# Patient Record
Sex: FEMALE | Race: WHITE | NOT HISPANIC OR LATINO | Employment: PART TIME | URBAN - METROPOLITAN AREA
[De-identification: names, ages, dates, MRNs, and addresses within clinical notes are randomized per-mention and may not be internally consistent; named-entity substitution may affect disease eponyms.]

---

## 2021-08-23 ENCOUNTER — OFFICE VISIT (OUTPATIENT)
Dept: URGENT CARE | Facility: CLINIC | Age: 61
End: 2021-08-23
Payer: COMMERCIAL

## 2021-08-23 VITALS
HEIGHT: 67 IN | OXYGEN SATURATION: 93 % | TEMPERATURE: 98.4 F | BODY MASS INDEX: 25.11 KG/M2 | WEIGHT: 160 LBS | RESPIRATION RATE: 20 BRPM | HEART RATE: 112 BPM

## 2021-08-23 DIAGNOSIS — H69.93 DISORDER OF BOTH EUSTACHIAN TUBES: ICD-10-CM

## 2021-08-23 DIAGNOSIS — R68.89 FLU-LIKE SYMPTOMS: Primary | ICD-10-CM

## 2021-08-23 PROCEDURE — G0382 LEV 3 HOSP TYPE B ED VISIT: HCPCS | Performed by: EMERGENCY MEDICINE

## 2021-08-23 PROCEDURE — U0005 INFEC AGEN DETEC AMPLI PROBE: HCPCS | Performed by: EMERGENCY MEDICINE

## 2021-08-23 PROCEDURE — U0003 INFECTIOUS AGENT DETECTION BY NUCLEIC ACID (DNA OR RNA); SEVERE ACUTE RESPIRATORY SYNDROME CORONAVIRUS 2 (SARS-COV-2) (CORONAVIRUS DISEASE [COVID-19]), AMPLIFIED PROBE TECHNIQUE, MAKING USE OF HIGH THROUGHPUT TECHNOLOGIES AS DESCRIBED BY CMS-2020-01-R: HCPCS | Performed by: EMERGENCY MEDICINE

## 2021-08-23 RX ORDER — PANTOPRAZOLE SODIUM 40 MG/1
40 TABLET, DELAYED RELEASE ORAL 2 TIMES DAILY
COMMUNITY

## 2021-08-23 RX ORDER — ALBUTEROL SULFATE 2.5 MG/3ML
2.5 SOLUTION RESPIRATORY (INHALATION) EVERY 6 HOURS PRN
Qty: 180 ML | Refills: 0 | Status: SHIPPED | OUTPATIENT
Start: 2021-08-23

## 2021-08-23 RX ORDER — SPIRONOLACTONE 25 MG/1
25 TABLET ORAL DAILY
COMMUNITY

## 2021-08-23 RX ORDER — RIBOFLAVIN (VITAMIN B2) 100 MG
100 TABLET ORAL DAILY
COMMUNITY

## 2021-08-23 RX ORDER — ZINC GLUCONATE 50 MG
50 TABLET ORAL DAILY
COMMUNITY

## 2021-08-23 RX ORDER — MAGNESIUM 30 MG
30 TABLET ORAL 2 TIMES DAILY
COMMUNITY

## 2021-08-23 RX ORDER — METOPROLOL TARTRATE 50 MG/1
50 TABLET, FILM COATED ORAL EVERY 12 HOURS SCHEDULED
COMMUNITY

## 2021-08-23 RX ORDER — MELATONIN
1000 DAILY
COMMUNITY

## 2021-08-23 RX ORDER — AMLODIPINE BESYLATE 10 MG/1
10 TABLET ORAL DAILY
COMMUNITY

## 2021-08-23 NOTE — PATIENT INSTRUCTIONS
You have been diagnosed with a flu-like illness, and your symptoms should resolve over the next 7 to 10 days with the treatments recommended today  If they do not, it is possible that you have developed a bacterial infection and you should return  If you were to take an antibiotic while you are still in the viral stage, you will not get better any faster, but could kill off  good germs in your body as well as make germs  resistant to the antibiotic  Take an expectorant - guaifenesin should be the only ingredient - during the day, and the cough suppressant (ex  Robitussin DM or Tessalon) if needed at night only  Take Zinc 12 5 to 15 mg every 2 - 3 hrs while awake for the next few days  You may take Cold Corky (13 3 mg of Zinc) or split a 25 mg Zinc tablet or lozenge in two or a 50 mg into four to get the proper dose  The total daily dose of Zinc should exceed 75 mg per day  You should also take Quercetin 500 mg twice daily  You may also take vitamin D 3 2000 i u s per day for the next 1 week  You may also take a decongestant like Sudafed, unless you have hypertension or cardiac disease  You may take Imodium for diarrhea according to package instructions  Flu-like illness   AMBULATORY CARE:   Flu-like illness is an infection caused by a virus  The flu is easily spread when an infected person coughs, sneezes, or has close contact with others  You may be able to spread the flu to others for 1 week or longer after signs or symptoms appear  Common signs and symptoms include the following:   · Fever and chills    · Headaches, body aches, and muscle or joint pain    · Cough, runny nose, and sore throat    · Loss of appetite, nausea, vomiting, or diarrhea    · Tiredness    · Trouble breathing  Call 911 for any of the following:   · You have trouble breathing, and your lips look purple or blue  · You have a seizure  Seek care immediately if:   · You are dizzy, or you are urinating less or not at all  · You have a headache with a stiff neck, and you feel tired or confused  · You have new pain or pressure in your chest     · Your symptoms, such as shortness of breath, vomiting, or diarrhea, get worse  · Your symptoms, such as fever and coughing, seem to get better, but then get worse  Contact your healthcare provider if:   · You have new muscle pain or weakness  · You have questions or concerns about your condition or care  Treatment for influenza  may include any of the following:  · Acetaminophen  decreases pain and fever  It is available without a doctor's order  Ask how much to take and how often to take it  Follow directions  Acetaminophen can cause liver damage if not taken correctly  · NSAIDs , such as ibuprofen, help decrease swelling, pain, and fever  This medicine is available with or without a doctor's order  NSAIDs can cause stomach bleeding or kidney problems in certain people  If you take blood thinner medicine, always ask your healthcare provider if NSAIDs are safe for you  Always read the medicine label and follow directions  · Antivirals  help fight a viral infection  Manage your symptoms:   · Rest  as much as you can to help you recover  · Drink liquids as directed  to help prevent dehydration  Ask how much liquid to drink each day and which liquids are best for you  Prevent the spread of the flu:   · Wash your hands often  Use soap and water  Wash your hands after you use the bathroom, change a child's diapers, or sneeze  Wash your hands before you prepare or eat food  Use gel hand cleanser when soap and water are not available  Do not touch your eyes, nose, or mouth unless you have washed your hands first        · Cover your mouth when you sneeze or cough  Cough into a tissue or the bend of your arm  · Clean shared items with a germ-killing   Clean table surfaces, doorknobs, and light switches   Do not share towels, silverware, and dishes with people who are sick  Wash bed sheets, towels, silverware, and dishes with soap and water  · Wear a mask  over your mouth and nose if you are sick or are near anyone who is sick  · Stay away from others  if you are sick  · Influenza vaccine  helps prevent influenza (flu)  Everyone older than 6 months should get a yearly influenza vaccine  Get the vaccine as soon as it is available, usually in September or October each year  Follow up with your healthcare provider as directed:  Write down your questions so you remember to ask them during your visits  © 2017 [employer]  Information is for End User's use only and may not be sold, redistributed or otherwise used for commercial purposes  All illustrations and images included in CareNotes® are the copyrighted property of [employer]  or Cristof[employer]  The above information is an  only  It is not intended as medical advice for individual conditions or treatments  Talk to your doctor, nurse or pharmacist before following any medical regimen to see if it is safe and effective for you  [address]     Your healthcare provider and/or public health staff have evaluated you and have determined that you do not need to be hospitalized at this time  At this time you can be isolated at home where you will be monitored by staff from your local or state health department  You should carefully follow the prevention and isolation steps below until a healthcare provider or local or state health department says that you can return to your normal activities  Stay home except to get medical care     People who are mildly ill with COVID-19 are able to isolate at home during their illness  You should restrict activities outside your home, except for getting medical care  Do not go to work, school, or public areas  Avoid using public transportation, ride-sharing, or taxis       Separate yourself from other people and animals in your home     People: As much as possible, you should stay in a specific room and away from other people in your home  Also, you should use a separate bathroom, if available  Animals: You should restrict contact with pets and other animals while you are sick with COVID-19, just like you would around other people  Although there have not been reports of pets or other animals becoming sick with COVID-19, it is still recommended that people sick with COVID-19 limit contact with animals until more information is known about the virus  When possible, have another member of your household care for your animals while you are sick  If you are sick with COVID-19, avoid contact with your pet, including petting, snuggling, being kissed or licked, and sharing food  If you must care for your pet or be around animals while you are sick, wash your hands before and after you interact with pets and wear a facemask  See COVID-19 and Animals for more information  Call ahead before visiting your doctor     If you have a medical appointment, call the healthcare provider and tell them that you have or may have COVID-19  This will help the healthcare providers office take steps to keep other people from getting infected or exposed  Wear a facemask     You should wear a facemask when you are around other people (e g , sharing a room or vehicle) or pets and before you enter a healthcare providers office  If you are not able to wear a facemask (for example, because it causes trouble breathing), then people who live with you should not stay in the same room with you, or they should wear a facemask if they enter your room  Cover your coughs and sneezes     Cover your mouth and nose with a tissue when you cough or sneeze  Throw used tissues in a lined trash can   Immediately wash your hands with soap and water for at least 20 seconds or, if soap and water are not available, clean your hands with an alcohol-based hand  that contains at least 60% alcohol  Clean your hands often     Wash your hands often with soap and water for at least 20 seconds, especially after blowing your nose, coughing, or sneezing; going to the bathroom; and before eating or preparing food  If soap and water are not readily available, use an alcohol-based hand  with at least 60% alcohol, covering all surfaces of your hands and rubbing them together until they feel dry  Soap and water are the best option if hands are visibly dirty  Avoid touching your eyes, nose, and mouth with unwashed hands  Avoid sharing personal household items     You should not share dishes, drinking glasses, cups, eating utensils, towels, or bedding with other people or pets in your home  After using these items, they should be washed thoroughly with soap and water  Clean all high-touch surfaces everyday     High touch surfaces include counters, tabletops, doorknobs, bathroom fixtures, toilets, phones, keyboards, tablets, and bedside tables  Also, clean any surfaces that may have blood, stool, or body fluids on them  Use a household cleaning spray or wipe, according to the label instructions  Labels contain instructions for safe and effective use of the cleaning product including precautions you should take when applying the product, such as wearing gloves and making sure you have good ventilation during use of the product  Monitor your symptoms     Seek prompt medical attention if your illness is worsening (e g , difficulty breathing)  Before seeking care, call your healthcare provider and tell them that you have, or are being evaluated for, COVID-19  Put on a facemask before you enter the facility  These steps will help the healthcare providers office to keep other people in the office or waiting room from getting infected or exposed  Ask your healthcare provider to call the local or [facility] health department   Persons who are placed under active monitoring or facilitated self-monitoring should follow instructions provided by their local health department or occupational health professionals, as appropriate  If you have a medical emergency and need to call 911, notify the dispatch personnel that you have, or are being evaluated for COVID-19  If possible, put on a facemask before emergency medical services arrive  Discontinuing home isolation     Patients with confirmed COVID-19 should remain under home isolation precautions until the risk of secondary transmission to others is thought to be low  The decision to discontinue home isolation precautions should be made on a case-by-case basis, in consultation with healthcare providers and state and local health departments       Source: [employer]        Proceed to ER if symptoms worsen

## 2021-08-23 NOTE — LETTER
August 28, 2021     Patient: Edgard Martin   YOB: 1960   Date of Visit: 8/23/2021       To Whom it May Concern:    Edgard Martin was seen in my clinic on 8/23/2021  She may return to work 9/2/2021 as long as symptoms have improved  If you have any questions or concerns, please don't hesitate to call           Sincerely,          Nicole Becerril MD

## 2021-08-23 NOTE — PROGRESS NOTES
[facility]        NAME: Soraida Self is a 62 y o  female  : 1960    MRN: [id number]  DATE: 2021  TIME: 12:58 PM    Assessment and Plan   Flu-like symptoms [R68 89]  1  Flu-like symptoms  Novel Coronavirus (Covid-19),PCR [facility] - Office Collection    albuterol (2 5 mg/3 mL) 0 083 % nebulizer solution   2  Disorder of both eustachian tubes     Explained to patient and that her oxygen level was slightly low and her heart rate was slightly high  I advised her to go to ER if her condition worsens or new symptoms develop  Consider prednisone taper if symptoms persist and COVID test negative    Patient Instructions     Patient Instructions     You have been diagnosed with a flu-like illness, and your symptoms should resolve over the next 7 to 10 days with the treatments recommended today  If they do not, it is possible that you have developed a bacterial infection and you should return  If you were to take an antibiotic while you are still in the viral stage, you will not get better any faster, but could kill off  good germs in your body as well as make germs  resistant to the antibiotic  Take an expectorant - guaifenesin should be the only ingredient - during the day, and the cough suppressant (ex  Robitussin DM or Tessalon) if needed at night only  Take Zinc 12 5 to 15 mg every 2 - 3 hrs while awake for the next few days  You may take Cold Corky (13 3 mg of Zinc) or split a 25 mg Zinc tablet or lozenge in two or a 50 mg into four to get the proper dose  The total daily dose of Zinc should exceed 75 mg per day  You should also take Quercetin 500 mg twice daily  You may also take vitamin D 3 2000 i u s per day for the next 1 week  You may also take a decongestant like Sudafed, unless you have hypertension or cardiac disease  You may take Imodium for diarrhea according to package instructions        Flu-like illness   AMBULATORY CARE:   Flu-like illness is an infection caused by a virus  The flu is easily spread when an infected person coughs, sneezes, or has close contact with others  You may be able to spread the flu to others for 1 week or longer after signs or symptoms appear  Common signs and symptoms include the following:   · Fever and chills    · Headaches, body aches, and muscle or joint pain    · Cough, runny nose, and sore throat    · Loss of appetite, nausea, vomiting, or diarrhea    · Tiredness    · Trouble breathing  Call 911 for any of the following:   · You have trouble breathing, and your lips look purple or blue  · You have a seizure  Seek care immediately if:   · You are dizzy, or you are urinating less or not at all  · You have a headache with a stiff neck, and you feel tired or confused  · You have new pain or pressure in your chest     · Your symptoms, such as shortness of breath, vomiting, or diarrhea, get worse  · Your symptoms, such as fever and coughing, seem to get better, but then get worse  Contact your healthcare provider if:   · You have new muscle pain or weakness  · You have questions or concerns about your condition or care  Treatment for influenza  may include any of the following:  · Acetaminophen  decreases pain and fever  It is available without a doctor's order  Ask how much to take and how often to take it  Follow directions  Acetaminophen can cause liver damage if not taken correctly  · NSAIDs , such as ibuprofen, help decrease swelling, pain, and fever  This medicine is available with or without a doctor's order  NSAIDs can cause stomach bleeding or kidney problems in certain people  If you take blood thinner medicine, always ask your healthcare provider if NSAIDs are safe for you  Always read the medicine label and follow directions  · Antivirals  help fight a viral infection  Manage your symptoms:   · Rest  as much as you can to help you recover  · Drink liquids as directed  to help prevent dehydration   Ask how much liquid to drink each day and which liquids are best for you  Prevent the spread of the flu:   · Wash your hands often  Use soap and water  Wash your hands after you use the bathroom, change a child's diapers, or sneeze  Wash your hands before you prepare or eat food  Use gel hand cleanser when soap and water are not available  Do not touch your eyes, nose, or mouth unless you have washed your hands first        · Cover your mouth when you sneeze or cough  Cough into a tissue or the bend of your arm  · Clean shared items with a germ-killing   Clean table surfaces, doorknobs, and light switches  Do not share towels, silverware, and dishes with people who are sick  Wash bed sheets, towels, silverware, and dishes with soap and water  · Wear a mask  over your mouth and nose if you are sick or are near anyone who is sick  · Stay away from others  if you are sick  · Influenza vaccine  helps prevent influenza (flu)  Everyone older than 6 months should get a yearly influenza vaccine  Get the vaccine as soon as it is available, usually in September or October each year  Follow up with your healthcare provider as directed:  Write down your questions so you remember to ask them during your visits  © 2017 [address] Information is for End User's use only and may not be sold, redistributed or otherwise used for commercial purposes  All illustrations and images included in CareNotes® are the copyrighted property of [employer]  or Cristofer Martinez  The above information is an  only  It is not intended as medical advice for individual conditions or treatments  Talk to your doctor, nurse or pharmacist before following any medical regimen to see if it is safe and effective for you       [address]     Your healthcare provider and/or public health staff have evaluated you and have determined that you do not need to be hospitalized at this time  At this time you can be isolated at home where you will be monitored by staff from your local or state health department  You should carefully follow the prevention and isolation steps below until a healthcare provider or local or state health department says that you can return to your normal activities  Stay home except to get medical care     People who are mildly ill with COVID-19 are able to isolate at home during their illness  You should restrict activities outside your home, except for getting medical care  Do not go to work, school, or public areas  Avoid using public transportation, ride-sharing, or taxis  Separate yourself from other people and animals in your home     People: As much as possible, you should stay in a specific room and away from other people in your home  Also, you should use a separate bathroom, if available  Animals: You should restrict contact with pets and other animals while you are sick with COVID-19, just like you would around other people  Although there have not been reports of pets or other animals becoming sick with COVID-19, it is still recommended that people sick with COVID-19 limit contact with animals until more information is known about the virus  When possible, have another member of your household care for your animals while you are sick  If you are sick with COVID-19, avoid contact with your pet, including petting, snuggling, being kissed or licked, and sharing food  If you must care for your pet or be around animals while you are sick, wash your hands before and after you interact with pets and wear a facemask  See COVID-19 and Animals for more information  Call ahead before visiting your doctor     If you have a medical appointment, call the healthcare provider and tell them that you have or may have COVID-19  This will help the healthcare providers office take steps to keep other people from getting infected or exposed       Wear a facemask     You should wear a facemask when you are around other people (e g , sharing a room or vehicle) or pets and before you enter a healthcare providers office  If you are not able to wear a facemask (for example, because it causes trouble breathing), then people who live with you should not stay in the same room with you, or they should wear a facemask if they enter your room  Cover your coughs and sneezes     Cover your mouth and nose with a tissue when you cough or sneeze  Throw used tissues in a lined trash can  Immediately wash your hands with soap and water for at least 20 seconds or, if soap and water are not available, clean your hands with an alcohol-based hand  that contains at least 60% alcohol  Clean your hands often     Wash your hands often with soap and water for at least 20 seconds, especially after blowing your nose, coughing, or sneezing; going to the bathroom; and before eating or preparing food  If soap and water are not readily available, use an alcohol-based hand  with at least 60% alcohol, covering all surfaces of your hands and rubbing them together until they feel dry  Soap and water are the best option if hands are visibly dirty  Avoid touching your eyes, nose, and mouth with unwashed hands  Avoid sharing personal household items     You should not share dishes, drinking glasses, cups, eating utensils, towels, or bedding with other people or pets in your home  After using these items, they should be washed thoroughly with soap and water  Clean all high-touch surfaces everyday     High touch surfaces include counters, tabletops, doorknobs, bathroom fixtures, toilets, phones, keyboards, tablets, and bedside tables  Also, clean any surfaces that may have blood, stool, or body fluids on them  Use a household cleaning spray or wipe, according to the label instructions   Labels contain instructions for safe and effective use of the cleaning product including precautions you should take when applying the product, such as wearing gloves and making sure you have good ventilation during use of the product  Monitor your symptoms     Seek prompt medical attention if your illness is worsening (e g , difficulty breathing)  Before seeking care, call your healthcare provider and tell them that you have, or are being evaluated for, COVID-19  Put on a facemask before you enter the facility  These steps will help the healthcare providers office to keep other people in the office or waiting room from getting infected or exposed  Ask your healthcare provider to call the local or [facility] health department  Persons who are placed under active monitoring or facilitated self-monitoring should follow instructions provided by their local health department or occupational health professionals, as appropriate  If you have a medical emergency and need to call 911, notify the dispatch personnel that you have, or are being evaluated for COVID-19  If possible, put on a facemask before emergency medical services arrive  Discontinuing home isolation     Patients with confirmed COVID-19 should remain under home isolation precautions until the risk of secondary transmission to others is thought to be low  The decision to discontinue home isolation precautions should be made on a case-by-case basis, in consultation with healthcare providers and [facility] and [facility] health departments  Source: [employer]        Proceed to ER if symptoms worsen      Follow up with PCP in 3-5 days  Proceed to  ER if symptoms worsen      Chief Complaint     Chief Complaint   Patient presents with    COVID-19     coughing, runny nose, bilateral ear pain, fatigue , no appetite and chest tightness x 2 weeks         History of Present Illness       Patient complains of cough, congestion, fatigue, decreased appetite, chest tightness, bilateral ear pain for the past 13 days  She is fully vaccinated against COVID  Symptoms developed 1 day after she returned home from a motorcycle rally  Cough is productive of clear sputum, nasal discharge is clear  She has a nebulizer at home but is out of albuterol nebulizer solution  She does have an albuterol inhaler but she has only used it a few times through the past 2 weeks  She quit smoking 30 years ago  Review of Systems   Review of Systems   Constitutional: Negative for chills and fever  HENT: Positive for congestion, ear pain and rhinorrhea  Negative for sinus pressure, sore throat, trouble swallowing and voice change  Respiratory: Positive for cough and chest tightness  Negative for shortness of breath and wheezing  Cardiovascular: Negative for chest pain           Current Medications       Current Outpatient Medications:     amLODIPine (NORVASC) 10 mg tablet, Take 10 mg by mouth daily, Disp: , Rfl:     Ascorbic Acid (vitamin C) 100 MG tablet, Take 100 mg by mouth daily, Disp: , Rfl:     cholecalciferol (VITAMIN D3) 1,000 units tablet, Take 1,000 Units by mouth daily, Disp: , Rfl:     magnesium 30 MG tablet, Take 30 mg by mouth 2 (two) times a day, Disp: , Rfl:     metoprolol tartrate (LOPRESSOR) 50 mg tablet, Take 50 mg by mouth every 12 (twelve) hours, Disp: , Rfl:     pantoprazole (PROTONIX) 40 mg tablet, Take 40 mg by mouth 2 (two) times a day, Disp: , Rfl:     spironolactone (ALDACTONE) 25 mg tablet, Take 25 mg by mouth daily, Disp: , Rfl:     zinc gluconate 50 mg tablet, Take 50 mg by mouth daily, Disp: , Rfl:     albuterol (2 5 mg/3 mL) 0 083 % nebulizer solution, Take 3 mL (2 5 mg total) by nebulization every 6 (six) hours as needed for shortness of breath Dispense 60 vials, Disp: 180 mL, Rfl: 0    Current Allergies     Allergies as of 08/23/2021 - never reviewed   Allergen Reaction Noted    Ace inhibitors Anaphylaxis 08/23/2021    Ciprofloxacin Rash 08/23/2021    Sulfa antibiotics Rash 08/23/2021            The following portions of the patient's history were reviewed and updated as appropriate: allergies, current medications, past family history, past medical history, past social history, past surgical history and problem list      Past Medical History:   Diagnosis Date    Diverticular disease     GERD (gastroesophageal reflux disease)     Hypertension        Past Surgical History:   Procedure Laterality Date    BOWEL RESECTION      HERNIA REPAIR      KNEE ARTHROSCOPY         Family History   Problem Relation Age of Onset    Dementia Mother     Cerebral aneurysm Father          Medications have been verified  Objective   Pulse (!) 112   Temp 98 4 °F (36 9 °C)   Resp 20   Ht 5' 7" (1 702 m)   Wt 72 6 kg (160 lb)   SpO2 93%   BMI 25 06 kg/m²        Physical Exam     Physical Exam  Vitals and nursing note reviewed  Constitutional:       General: She is not in acute distress  Appearance: She is well-developed  HENT:      Head: Normocephalic and atraumatic  Right Ear: Tympanic membrane normal       Left Ear: Tympanic membrane normal       Nose: Mucosal edema and congestion present  Mouth/Throat:      Pharynx: Posterior oropharyngeal erythema present  No oropharyngeal exudate  Tonsils: No tonsillar abscesses  Cardiovascular:      Rate and Rhythm: Regular rhythm  Comments: Mild tachycardia  Pulmonary:      Effort: Pulmonary effort is normal  No respiratory distress  Breath sounds: No wheezing or rales  Abdominal:      General: Bowel sounds are normal       Palpations: Abdomen is soft  Musculoskeletal:      Cervical back: Neck supple  Skin:     General: Skin is warm and dry  Findings: No rash  Neurological:      Mental Status: She is alert and oriented to person, place, and time  Psychiatric:         Mood and Affect: Mood normal          Behavior: Behavior normal          Thought Content:  Thought content normal  Judgment: Judgment normal

## 2021-08-24 LAB — SARS-COV-2 RNA RESP QL NAA+PROBE: POSITIVE

## 2021-09-14 ENCOUNTER — HOSPITAL ENCOUNTER (EMERGENCY)
Facility: HOSPITAL | Age: 61
Discharge: HOME | End: 2021-09-14
Attending: EMERGENCY MEDICINE
Payer: COMMERCIAL

## 2021-09-14 ENCOUNTER — APPOINTMENT (EMERGENCY)
Dept: CT IMAGING | Facility: HOSPITAL | Age: 61
End: 2021-09-14
Payer: COMMERCIAL

## 2021-09-14 VITALS
OXYGEN SATURATION: 94 % | TEMPERATURE: 98.3 F | SYSTOLIC BLOOD PRESSURE: 120 MMHG | WEIGHT: 171.96 LBS | HEART RATE: 96 BPM | DIASTOLIC BLOOD PRESSURE: 70 MMHG | RESPIRATION RATE: 19 BRPM | BODY MASS INDEX: 26.93 KG/M2

## 2021-09-14 DIAGNOSIS — U07.1 COVID-19: Primary | ICD-10-CM

## 2021-09-14 DIAGNOSIS — R74.01 ELEVATED TRANSAMINASE LEVEL: ICD-10-CM

## 2021-09-14 LAB
ALBUMIN SERPL BCP-MCNC: 4 G/DL (ref 3.5–5)
ALP SERPL-CCNC: 205 U/L (ref 46–116)
ALT SERPL W P-5'-P-CCNC: 139 U/L (ref 12–78)
ANION GAP SERPL CALCULATED.3IONS-SCNC: 18 MMOL/L (ref 4–13)
AST SERPL W P-5'-P-CCNC: 124 U/L (ref 5–45)
BASOPHILS # BLD AUTO: 0.04 THOUSANDS/ΜL (ref 0–0.1)
BASOPHILS NFR BLD AUTO: 1 % (ref 0–1)
BILIRUB SERPL-MCNC: 0.56 MG/DL (ref 0.2–1)
BUN SERPL-MCNC: 18 MG/DL (ref 5–25)
CALCIUM SERPL-MCNC: 8.9 MG/DL (ref 8.3–10.1)
CHLORIDE SERPL-SCNC: 102 MMOL/L (ref 100–108)
CO2 SERPL-SCNC: 23 MMOL/L (ref 21–32)
CREAT SERPL-MCNC: 0.8 MG/DL (ref 0.6–1.3)
EOSINOPHIL # BLD AUTO: 0.04 THOUSAND/ΜL (ref 0–0.61)
EOSINOPHIL NFR BLD AUTO: 1 % (ref 0–6)
ERYTHROCYTE [DISTWIDTH] IN BLOOD BY AUTOMATED COUNT: 14.2 % (ref 11.6–15.1)
GFR SERPL CREATININE-BSD FRML MDRD: 81 ML/MIN/1.73SQ M
GLUCOSE SERPL-MCNC: 95 MG/DL (ref 65–140)
HCT VFR BLD AUTO: 44.2 % (ref 34.8–46.1)
HGB BLD-MCNC: 15 G/DL (ref 11.5–15.4)
IMM GRANULOCYTES # BLD AUTO: 0.01 THOUSAND/UL (ref 0–0.2)
IMM GRANULOCYTES NFR BLD AUTO: 0 % (ref 0–2)
LYMPHOCYTES # BLD AUTO: 2.39 THOUSANDS/ΜL (ref 0.6–4.47)
LYMPHOCYTES NFR BLD AUTO: 41 % (ref 14–44)
MCH RBC QN AUTO: 35.1 PG (ref 26.8–34.3)
MCHC RBC AUTO-ENTMCNC: 33.9 G/DL (ref 31.4–37.4)
MCV RBC AUTO: 104 FL (ref 82–98)
MONOCYTES # BLD AUTO: 0.67 THOUSAND/ΜL (ref 0.17–1.22)
MONOCYTES NFR BLD AUTO: 12 % (ref 4–12)
NEUTROPHILS # BLD AUTO: 2.66 THOUSANDS/ΜL (ref 1.85–7.62)
NEUTS SEG NFR BLD AUTO: 45 % (ref 43–75)
NRBC BLD AUTO-RTO: 0 /100 WBCS
NT-PROBNP SERPL-MCNC: 28 PG/ML
PLATELET # BLD AUTO: 222 THOUSANDS/UL (ref 149–390)
PMV BLD AUTO: 9.1 FL (ref 8.9–12.7)
POTASSIUM SERPL-SCNC: 3.3 MMOL/L (ref 3.5–5.3)
PROT SERPL-MCNC: 8.1 G/DL (ref 6.4–8.2)
RBC # BLD AUTO: 4.27 MILLION/UL (ref 3.81–5.12)
SODIUM SERPL-SCNC: 143 MMOL/L (ref 136–145)
TROPONIN I SERPL-MCNC: <0.02 NG/ML
WBC # BLD AUTO: 5.81 THOUSAND/UL (ref 4.31–10.16)

## 2021-09-14 PROCEDURE — 96360 HYDRATION IV INFUSION INIT: CPT

## 2021-09-14 PROCEDURE — 93005 ELECTROCARDIOGRAM TRACING: CPT

## 2021-09-14 PROCEDURE — 85025 COMPLETE CBC W/AUTO DIFF WBC: CPT | Performed by: EMERGENCY MEDICINE

## 2021-09-14 PROCEDURE — 99285 EMERGENCY DEPT VISIT HI MDM: CPT | Performed by: EMERGENCY MEDICINE

## 2021-09-14 PROCEDURE — 99285 EMERGENCY DEPT VISIT HI MDM: CPT

## 2021-09-14 PROCEDURE — 84484 ASSAY OF TROPONIN QUANT: CPT | Performed by: EMERGENCY MEDICINE

## 2021-09-14 PROCEDURE — 80053 COMPREHEN METABOLIC PANEL: CPT | Performed by: EMERGENCY MEDICINE

## 2021-09-14 PROCEDURE — 83880 ASSAY OF NATRIURETIC PEPTIDE: CPT | Performed by: EMERGENCY MEDICINE

## 2021-09-14 PROCEDURE — 71275 CT ANGIOGRAPHY CHEST: CPT

## 2021-09-14 PROCEDURE — 96361 HYDRATE IV INFUSION ADD-ON: CPT

## 2021-09-14 PROCEDURE — 36415 COLL VENOUS BLD VENIPUNCTURE: CPT | Performed by: EMERGENCY MEDICINE

## 2021-09-14 RX ADMIN — SODIUM CHLORIDE 1000 ML: 0.9 INJECTION, SOLUTION INTRAVENOUS at 11:41

## 2021-09-14 RX ADMIN — IOHEXOL 85 ML: 350 INJECTION, SOLUTION INTRAVENOUS at 12:30

## 2021-09-14 NOTE — DISCHARGE INSTRUCTIONS
Elevated liver transaminases  Return immediately if worse or any new symptoms  Tylenol 1000 mg every 6 hours as needed  and/or  Advil 400 mg every 6 hours as needed  May take both together  Please call your physician today and arrange evaluation within the next week

## 2021-09-14 NOTE — ED PROVIDER NOTES
History  Chief Complaint   Patient presents with    Shortness of Breath     PCP wants to r/o blood clots     78-year-old female notes her home pulse oximeter registered 93 % on room air and was concerned, has some residual chest heaviness and dyspnea, fatigue after diagnosed with COVID infection 8/23, ongoing since 8/10  No hemoptysis  No prior history of CAD, VTE or CVA  Denies COPD, but often gets upper respiratory infections      History provided by:  Patient  Shortness of Breath  Severity:  Mild  Onset quality:  Gradual  Timing:  Constant  Progression:  Unchanged  Chronicity:  New  Context: activity and URI    Relieved by:  None tried  Worsened by: Activity  Associated symptoms: cough    Associated symptoms: no fever, no hemoptysis and no sputum production        Prior to Admission Medications   Prescriptions Last Dose Informant Patient Reported? Taking?    Ascorbic Acid (vitamin C) 100 MG tablet   Yes No   Sig: Take 100 mg by mouth daily   albuterol (2 5 mg/3 mL) 0 083 % nebulizer solution   No No   Sig: Take 3 mL (2 5 mg total) by nebulization every 6 (six) hours as needed for shortness of breath Dispense 60 vials   amLODIPine (NORVASC) 10 mg tablet   Yes No   Sig: Take 10 mg by mouth daily   cholecalciferol (VITAMIN D3) 1,000 units tablet   Yes No   Sig: Take 1,000 Units by mouth daily   magnesium 30 MG tablet   Yes No   Sig: Take 30 mg by mouth 2 (two) times a day   metoprolol tartrate (LOPRESSOR) 50 mg tablet   Yes No   Sig: Take 50 mg by mouth every 12 (twelve) hours   pantoprazole (PROTONIX) 40 mg tablet   Yes No   Sig: Take 40 mg by mouth 2 (two) times a day   spironolactone (ALDACTONE) 25 mg tablet   Yes No   Sig: Take 25 mg by mouth daily   zinc gluconate 50 mg tablet   Yes No   Sig: Take 50 mg by mouth daily      Facility-Administered Medications: None       Past Medical History:   Diagnosis Date    Anxiety     Diverticular disease     GERD (gastroesophageal reflux disease)     Hypertension Past Surgical History:   Procedure Laterality Date    BOWEL RESECTION      HERNIA REPAIR      KNEE ARTHROSCOPY         Family History   Problem Relation Age of Onset    Dementia Mother     Cerebral aneurysm Father      I have reviewed and agree with the history as documented  E-Cigarette/Vaping    E-Cigarette Use Never User      E-Cigarette/Vaping Substances     Social History     Tobacco Use    Smoking status: Former Smoker     Quit date:      Years since quittin 7    Smokeless tobacco: Never Used   Vaping Use    Vaping Use: Never used   Substance Use Topics    Alcohol use: Yes    Drug use: Never       Review of Systems   Constitutional: Negative for fever  Respiratory: Positive for cough and shortness of breath  Negative for hemoptysis and sputum production  All other systems reviewed and are negative  Physical Exam  Physical Exam  Vitals and nursing note reviewed  Constitutional:       Appearance: She is not ill-appearing  Comments: Pleasant, comfortable-appearing   HENT:      Head: Normocephalic and atraumatic  Eyes:      Conjunctiva/sclera: Conjunctivae normal       Pupils: Pupils are equal, round, and reactive to light  Cardiovascular:      Rate and Rhythm: Normal rate and regular rhythm  Heart sounds: Normal heart sounds  Pulmonary:      Effort: Pulmonary effort is normal       Breath sounds: Normal breath sounds  No wheezing or rales  Abdominal:      General: Bowel sounds are normal  There is no distension  Palpations: Abdomen is soft  Tenderness: There is no abdominal tenderness  Musculoskeletal:         General: Normal range of motion  Cervical back: Neck supple  Skin:     General: Skin is warm and dry  Neurological:      General: No focal deficit present  Mental Status: She is alert and oriented to person, place, and time  Cranial Nerves: No cranial nerve deficit        Coordination: Coordination normal  Psychiatric:         Mood and Affect: Mood is anxious  Behavior: Behavior normal          Thought Content:  Thought content normal          Judgment: Judgment normal          Vital Signs  ED Triage Vitals [09/14/21 1112]   Temperature Pulse Respirations Blood Pressure SpO2   98 4 °F (36 9 °C) (!) 107 20 150/72 100 %      Temp src Heart Rate Source Patient Position - Orthostatic VS BP Location FiO2 (%)   -- -- Sitting Right arm --      Pain Score       --           Vitals:    09/14/21 1112 09/14/21 1215   BP: 150/72 114/94   Pulse: (!) 107 90   Patient Position - Orthostatic VS: Sitting          Visual Acuity      ED Medications  Medications   sodium chloride 0 9 % bolus 1,000 mL (1,000 mL Intravenous New Bag 9/14/21 1141)   iohexol (OMNIPAQUE) 350 MG/ML injection (SINGLE-DOSE) 85 mL (85 mL Intravenous Given 9/14/21 1230)       Diagnostic Studies  Results Reviewed     Procedure Component Value Units Date/Time    Comprehensive metabolic panel [[id number]]  (Abnormal) Collected: 09/14/21 1141    Lab Status: Final result Specimen: Blood from Arm, Right Updated: 09/14/21 1215     Sodium 143 mmol/L      Potassium 3 3 mmol/L      Chloride 102 mmol/L      CO2 23 mmol/L      ANION GAP 18 mmol/L      BUN 18 mg/dL      Creatinine 0 80 mg/dL      Glucose 95 mg/dL      Calcium 8 9 mg/dL       U/L       U/L      Alkaline Phosphatase 205 U/L      Total Protein 8 1 g/dL      Albumin 4 0 g/dL      Total Bilirubin 0 56 mg/dL      eGFR 81 ml/min/1 73sq m     Narrative:      Sung guidelines for Chronic Kidney Disease (CKD):     Stage 1 with normal or high GFR (GFR > 90 mL/min/1 73 square meters)    Stage 2 Mild CKD (GFR = 60-89 mL/min/1 73 square meters)    Stage 3A Moderate CKD (GFR = 45-59 mL/min/1 73 square meters)    Stage 3B Moderate CKD (GFR = 30-44 mL/min/1 73 square meters)    Stage 4 Severe CKD (GFR = 15-29 mL/min/1 73 square meters)    Stage 5 End Stage CKD (GFR <15 mL/min/1 73 square meters)  Note: GFR calculation is accurate only with a steady state creatinine    NT-BNP PRO [[id number]]  (Normal) Collected: 09/14/21 1141    Lab Status: Final result Specimen: Blood from Arm, Right Updated: 09/14/21 1215     NT-proBNP 28 pg/mL     Troponin I [[id number]]  (Normal) Collected: 09/14/21 1141    Lab Status: Final result Specimen: Blood from Arm, Right Updated: 09/14/21 1213     Troponin I <0 02 ng/mL     CBC and differential [[id number]]  (Abnormal) Collected: 09/14/21 1141    Lab Status: Final result Specimen: Blood from Arm, Right Updated: 09/14/21 1153     WBC 5 81 Thousand/uL      RBC 4 27 Million/uL      Hemoglobin 15 0 g/dL      Hematocrit 44 2 %       fL      MCH 35 1 pg      MCHC 33 9 g/dL      RDW 14 2 %      MPV 9 1 fL      Platelets 370 Thousands/uL      nRBC 0 /100 WBCs      Neutrophils Relative 45 %      Immat GRANS % 0 %      Lymphocytes Relative 41 %      Monocytes Relative 12 %      Eosinophils Relative 1 %      Basophils Relative 1 %      Neutrophils Absolute 2 66 Thousands/µL      Immature Grans Absolute 0 01 Thousand/uL      Lymphocytes Absolute 2 39 Thousands/µL      Monocytes Absolute 0 67 Thousand/µL      Eosinophils Absolute 0 04 Thousand/µL      Basophils Absolute 0 04 Thousands/µL                  CTA ED chest PE study   Final Result by Mane Bruner MD (09/14 1317)      No PE identified  No evidence of pneumonia              Workstation performed: [id number]                    Procedures  Procedures         ED Course  ED Course as of Sep 14 1330   Tue Sep 14, 2021   1147 EKG 1142 normal sinus rhythm rate 94 normal axis normal intervals no ST elevation or depression T-wave inversion V1 lead 3 interpreted by me      1236 NT-proBNP: 28   1236 Troponin I: <0 02   1236 Potassium(!): 3 3   1236 AST(!): 124   1237 ALT(!): 139   1237 Alkaline Phosphatase(!): 205   1326 States she feels generally okay, no dyspnea or discomfort, we discussed results including potassium liver function tests and voices good understanding, notes history of hypokalemia and will follow with her physician through [facility], call today for appointment within, return worse or any other complaints, symptoms                HEART Risk Score      Most Recent Value   Heart Score Risk Calculator   History  0 Filed at: 09/14/2021 1239   ECG  0 Filed at: 09/14/2021 1239   Age  1 Filed at: 09/14/2021 1239   Risk Factors  1 Filed at: 09/14/2021 1239   Troponin  0 Filed at: 09/14/2021 1239   HEART Score  2 Filed at: 09/14/2021 1239                      SBIRT 20yo+      Most Recent Value   SBIRT (23 yo +)   In order to provide better care to our patients, we are screening all of our patients for alcohol and drug use  Would it be okay to ask you these screening questions? Yes Filed at: 09/14/2021 1124   Initial Alcohol Screen: US AUDIT-C    1  How often do you have a drink containing alcohol? 3 Filed at: 09/14/2021 1124   2  How many drinks containing alcohol do you have on a typical day you are drinking? 2 Filed at: 09/14/2021 1124   3a  Male UNDER 65: How often do you have five or more drinks on one occasion? 0 Filed at: 09/14/2021 1124   3b  FEMALE Any Age, or MALE 65+: How often do you have 4 or more drinks on one occassion? 0 Filed at: 09/14/2021 1124   Audit-C Score  5 Filed at: 09/14/2021 1124   ROLANDO: How many times in the past year have you    Used an illegal drug or used a prescription medication for non-medical reasons?   Never Filed at: 09/14/2021 1124                    MDM    Disposition  Final diagnoses:   COVID-19   Elevated transaminase level     Time reflects when diagnosis was documented in both MDM as applicable and the Disposition within this note     Time User Action Codes Description Comment    9/14/2021 12:40 PM Ngoc Rojas Add [U07 1] COVID-19     9/14/2021 12:40 PM Mikayla Enriquez Add [R74 01,  R74 02] Elevated levels of transaminase & lactic acid dehydrogenase     9/14/2021 12:40 PM Ngoc Rojas Remove [R74 01,  R74 02] Elevated levels of transaminase & lactic acid dehydrogenase     9/14/2021 12:41 PM Ngoc Rojas Add [R74 01] Elevated transaminase level       ED Disposition     ED Disposition Condition Date/Time Comment    Discharge Stable Tue Sep 14, 2021  1:26 PM Sandhya Burgess discharge to home/self care  Follow-up Information    None         Patient's Medications   Discharge Prescriptions    No medications on file     No discharge procedures on file      PDMP Review     None          ED Provider  Electronically Signed by           Keri Rodriguez DO  09/14/21 2001

## 2021-09-19 LAB
ATRIAL RATE: 94 BPM
P AXIS: 33 DEGREES
PR INTERVAL: 168 MS
QRS AXIS: -4 DEGREES
QRSD INTERVAL: 80 MS
QT INTERVAL: 368 MS
QTC INTERVAL: 460 MS
T WAVE AXIS: 38 DEGREES
VENTRICULAR RATE: 94 BPM

## 2021-09-19 PROCEDURE — 93010 ELECTROCARDIOGRAM REPORT: CPT | Performed by: INTERNAL MEDICINE

## 2023-09-06 ENCOUNTER — OFFICE VISIT (OUTPATIENT)
Dept: URGENT CARE | Facility: CLINIC | Age: 63
End: 2023-09-06
Payer: COMMERCIAL

## 2023-09-06 ENCOUNTER — APPOINTMENT (OUTPATIENT)
Dept: RADIOLOGY | Facility: CLINIC | Age: 63
End: 2023-09-06
Payer: COMMERCIAL

## 2023-09-06 VITALS
HEIGHT: 67 IN | OXYGEN SATURATION: 97 % | HEART RATE: 91 BPM | DIASTOLIC BLOOD PRESSURE: 74 MMHG | SYSTOLIC BLOOD PRESSURE: 147 MMHG | TEMPERATURE: 97.8 F | BODY MASS INDEX: 27 KG/M2 | WEIGHT: 172 LBS | RESPIRATION RATE: 18 BRPM

## 2023-09-06 DIAGNOSIS — S56.911A FOREARM STRAIN, RIGHT, INITIAL ENCOUNTER: Primary | ICD-10-CM

## 2023-09-06 DIAGNOSIS — S56.911A FOREARM STRAIN, RIGHT, INITIAL ENCOUNTER: ICD-10-CM

## 2023-09-06 DIAGNOSIS — S50.11XA CONTUSION OF RIGHT FOREARM, INITIAL ENCOUNTER: ICD-10-CM

## 2023-09-06 PROCEDURE — 99283 EMERGENCY DEPT VISIT LOW MDM: CPT | Performed by: PHYSICIAN ASSISTANT

## 2023-09-06 PROCEDURE — 73110 X-RAY EXAM OF WRIST: CPT

## 2023-09-06 PROCEDURE — G0382 LEV 3 HOSP TYPE B ED VISIT: HCPCS | Performed by: PHYSICIAN ASSISTANT
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
## 2023-11-14 ENCOUNTER — APPOINTMENT (EMERGENCY)
Dept: CT IMAGING | Facility: HOSPITAL | Age: 63
End: 2023-11-14
Payer: COMMERCIAL

## 2023-11-14 ENCOUNTER — HOSPITAL ENCOUNTER (EMERGENCY)
Facility: HOSPITAL | Age: 63
Discharge: HOME | End: 2023-11-14
Attending: EMERGENCY MEDICINE
Payer: COMMERCIAL

## 2023-11-14 VITALS
HEART RATE: 100 BPM | RESPIRATION RATE: 18 BRPM | WEIGHT: 164.24 LBS | SYSTOLIC BLOOD PRESSURE: 144 MMHG | DIASTOLIC BLOOD PRESSURE: 82 MMHG | HEIGHT: 67 IN | OXYGEN SATURATION: 97 % | BODY MASS INDEX: 25.78 KG/M2 | TEMPERATURE: 96.4 F
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
## 2023-11-14 DIAGNOSIS — S22.49XA MULTIPLE RIB FRACTURES: Primary | ICD-10-CM

## 2023-11-14 LAB
ATRIAL RATE: 107 BPM
PR INTERVAL: 112 MS
QRS AXIS: 33 DEGREES
QRSD INTERVAL: 72 MS
QT INTERVAL: 492 MS
QTC INTERVAL: 656 MS
T WAVE AXIS: 70 DEGREES
VENTRICULAR RATE: 107 BPM

## 2023-11-14 PROCEDURE — 99284 EMERGENCY DEPT VISIT MOD MDM: CPT

## 2023-11-14 PROCEDURE — 71250 CT THORAX DX C-: CPT

## 2023-11-14 PROCEDURE — 74176 CT ABD & PELVIS W/O CONTRAST: CPT

## 2023-11-14 PROCEDURE — 93005 ELECTROCARDIOGRAM TRACING: CPT

## 2023-11-14 PROCEDURE — G1004 CDSM NDSC: HCPCS

## 2023-11-14 PROCEDURE — 99284 EMERGENCY DEPT VISIT MOD MDM: CPT | Performed by: EMERGENCY MEDICINE

## 2023-11-14 RX ORDER — ONDANSETRON 4 MG/1
4 TABLET, ORALLY DISINTEGRATING ORAL ONCE
Status: COMPLETED | OUTPATIENT
Start: 2023-11-14 | End: 2023-11-14

## 2023-11-14 RX ORDER — HYDROCODONE BITARTRATE AND ACETAMINOPHEN 5; 325 MG/1; MG/1
1 TABLET ORAL EVERY 6 HOURS PRN
Qty: 15 TABLET | Refills: 0 | Status: SHIPPED | OUTPATIENT
Start: 2023-11-14 | End: 2023-11-24

## 2023-11-14 RX ORDER — HYDROCODONE BITARTRATE AND ACETAMINOPHEN 5; 325 MG/1; MG/1
1 TABLET ORAL EVERY 6 HOURS PRN
Qty: 15 TABLET | Refills: 0 | Status: SHIPPED | OUTPATIENT
Start: 2023-11-14 | End: 2023-11-14 | Stop reason: SDUPTHER

## 2023-11-14 RX ORDER — LIDOCAINE 50 MG/G
1 PATCH TOPICAL ONCE
Status: DISCONTINUED | OUTPATIENT
Start: 2023-11-14 | End: 2023-11-14 | Stop reason: HOSPADM

## 2023-11-14 RX ORDER — HYDROCODONE BITARTRATE AND ACETAMINOPHEN 5; 325 MG/1; MG/1
1 TABLET ORAL ONCE
Status: COMPLETED | OUTPATIENT
Start: 2023-11-14 | End: 2023-11-14

## 2023-11-14 RX ADMIN — LIDOCAINE 5% 1 PATCH: 700 PATCH TOPICAL at 06:49

## 2023-11-14 RX ADMIN — ONDANSETRON 4 MG: 4 TABLET, ORALLY DISINTEGRATING ORAL at 05:47

## 2023-11-14 RX ADMIN — HYDROCODONE BITARTRATE AND ACETAMINOPHEN 1 TABLET: 5; 325 TABLET ORAL at 05:47

## 2023-11-14 NOTE — ED PROVIDER NOTES
History  Chief Complaint   Patient presents with    Rib Pain     Pt reports falling on Friday, tripped over a laundry basket and fell onto the floor hitting her left side. Feeling left sided rib pain. She isn't sure if she hit her head, denies LOC, denies blood thinners. Vomiting     Pt also c/o vomiting that start Saturday night and decreased appetite. Worsening her rib pain. Patient is a 80-year-old female with history of anxiety GERD and hypertension presents emergency department due to injury to the left lower lateral rib cage region patient reports she was walking and tripped over a laundry basket 2 days ago and fell onto her left side no strike on head or loss of consciousness no neck pain no other injury no numbness or weakness. Patient complains of worsening pain in this area now developing pain across the rib cage to the right side associated with some nausea and vomiting believes secondary to the pain. History provided by:  Patient  Chest Pain  Pain location:  L lateral chest  Pain quality: sharp and stabbing    Pain severity:  Moderate  Onset quality:  Sudden  Duration:  2 days  Timing:  Constant  Progression:  Worsening  Chronicity:  New  Associated symptoms: nausea and vomiting    Associated symptoms: no abdominal pain, no cough, no dizziness, no fatigue, no fever, no headache, no numbness, no palpitations, no shortness of breath and no weakness        Prior to Admission Medications   Prescriptions Last Dose Informant Patient Reported? Taking?    Ascorbic Acid (vitamin C) 100 MG tablet   Yes No   Sig: Take 100 mg by mouth daily   albuterol (2.5 mg/3 mL) 0.083 % nebulizer solution   No No   Sig: Take 3 mL (2.5 mg total) by nebulization every 6 (six) hours as needed for shortness of breath Dispense 60 vials   amLODIPine (NORVASC) 10 mg tablet   Yes No   Sig: Take 10 mg by mouth daily   cholecalciferol (VITAMIN D3) 1,000 units tablet   Yes No   Sig: Take 1,000 Units by mouth daily   magnesium 30 MG tablet   Yes No   Sig: Take 30 mg by mouth 2 (two) times a day   metoprolol tartrate (LOPRESSOR) 50 mg tablet   Yes No   Sig: Take 50 mg by mouth every 12 (twelve) hours   pantoprazole (PROTONIX) 40 mg tablet   Yes No   Sig: Take 40 mg by mouth 2 (two) times a day   spironolactone (ALDACTONE) 25 mg tablet   Yes No   Sig: Take 25 mg by mouth daily   zinc gluconate 50 mg tablet   Yes No   Sig: Take 50 mg by mouth daily      Facility-Administered Medications: None       Past Medical History:   Diagnosis Date    Anxiety     Diverticular disease     GERD (gastroesophageal reflux disease)     Hypertension        Past Surgical History:   Procedure Laterality Date    BOWEL RESECTION      HERNIA REPAIR      KNEE ARTHROSCOPY      SHOULDER SURGERY Left        Family History   Problem Relation Age of Onset    Dementia Mother     Cerebral aneurysm Father      I have reviewed and agree with the history as documented. E-Cigarette/Vaping    E-Cigarette Use Never User      E-Cigarette/Vaping Substances     Social History     Tobacco Use    Smoking status: Former     Types: Cigarettes     Quit date:      Years since quittin.8    Smokeless tobacco: Never   Vaping Use    Vaping Use: Never used   Substance Use Topics    Alcohol use: Yes     Comment: social    Drug use: Never       Review of Systems   Constitutional:  Negative for activity change, appetite change, chills, fatigue and fever. HENT:  Negative for congestion, ear pain, rhinorrhea and sore throat. Eyes:  Negative for discharge, redness and visual disturbance. Respiratory:  Negative for cough, chest tightness, shortness of breath and wheezing. Cardiovascular:  Positive for chest pain. Negative for palpitations. Gastrointestinal:  Positive for nausea and vomiting. Negative for abdominal pain, constipation and diarrhea. Endocrine: Negative for polydipsia and polyuria.    Genitourinary:  Negative for difficulty urinating, dysuria, frequency, hematuria and urgency. Musculoskeletal:  Negative for arthralgias and myalgias. Skin:  Negative for color change, pallor and rash. Neurological:  Negative for dizziness, weakness, light-headedness, numbness and headaches. Hematological:  Negative for adenopathy. Does not bruise/bleed easily. All other systems reviewed and are negative. Physical Exam  Physical Exam  Vitals and nursing note reviewed. Constitutional:       Appearance: She is well-developed. HENT:      Head: Normocephalic and atraumatic. Right Ear: External ear normal.      Left Ear: External ear normal.      Nose: Nose normal.   Eyes:      Conjunctiva/sclera: Conjunctivae normal.      Pupils: Pupils are equal, round, and reactive to light. Cardiovascular:      Rate and Rhythm: Regular rhythm. Tachycardia present. Heart sounds: Normal heart sounds. Pulmonary:      Effort: Pulmonary effort is normal. No respiratory distress. Breath sounds: Normal breath sounds. No wheezing or rales. Chest:      Chest wall: Tenderness present. No deformity or crepitus. Abdominal:      General: Bowel sounds are normal. There is no distension. Palpations: Abdomen is soft. Tenderness: There is abdominal tenderness in the left upper quadrant. There is no guarding. Musculoskeletal:         General: Normal range of motion. Cervical back: Normal range of motion and neck supple. Skin:     General: Skin is warm and dry. Neurological:      Mental Status: She is alert and oriented to person, place, and time. Cranial Nerves: No cranial nerve deficit. Sensory: No sensory deficit.          Vital Signs  ED Triage Vitals [11/14/23 0539]   Temperature Pulse Respirations Blood Pressure SpO2   (!) 96.4 °F (35.8 °C) (!) 119 18 134/67 99 %      Temp Source Heart Rate Source Patient Position - Orthostatic VS BP Location FiO2 (%)   Temporal Monitor Lying Right arm --      Pain Score       9           Vitals:    11/14/23 0539   BP: 134/67   Pulse: (!) 119   Patient Position - Orthostatic VS: Lying         Visual Acuity  Visual Acuity      Flowsheet Row Most Recent Value   L Pupil Size (mm) 3   R Pupil Size (mm) 3            ED Medications  Medications   HYDROcodone-acetaminophen (NORCO) 5-325 mg per tablet 1 tablet (1 tablet Oral Given 11/14/23 0547)   ondansetron (ZOFRAN-ODT) dispersible tablet 4 mg (4 mg Oral Given 11/14/23 0547)       Diagnostic Studies  Results Reviewed       None                   CT chest abdomen pelvis wo contrast    (Results Pending)              Procedures  ECG 12 Lead Documentation Only    Date/Time: 11/14/2023 5:52 AM    Performed by: Xi Amanda DO  Authorized by: Xi Amanda DO    ECG reviewed by me, the ED Provider: yes    Patient location:  ED  Quality:     Tracing quality:  Limited by artifact  Rate:     ECG rate:  107    ECG rate assessment: tachycardic    Rhythm:     Rhythm: sinus tachycardia    QRS:     QRS axis:  Normal    QRS intervals:  Normal  Conduction:     Conduction: normal    ST segments:     ST segments:  Normal  T waves:     T waves: non-specific and flattening    Other findings:     Other findings: prolonged qTc interval             ED Course  ED Course as of 11/14/23 0558   Tue Nov 14, 2023   0556 Case discussed and care transferred to Dr. Selwyn Castaneda pending CT results and disposition. SBIRT 20yo+      Flowsheet Row Most Recent Value   Initial Alcohol Screen: US AUDIT-C     1. How often do you have a drink containing alcohol? 0 Filed at: 11/14/2023 0543   2. How many drinks containing alcohol do you have on a typical day you are drinking? 0 Filed at: 11/14/2023 0543   3b. FEMALE Any Age, or MALE 65+: How often do you have 4 or more drinks on one occassion? 0 Filed at: 11/14/2023 0543   Audit-C Score 0 Filed at: 11/14/2023 0543   ROLANDO: How many times in the past year have you. ..     Used an illegal drug or used a prescription medication for non-medical reasons? Never Filed at: 11/14/2023 0543                      Medical Decision Making  Amount and/or Complexity of Data Reviewed  Radiology: ordered and independent interpretation performed. Decision-making details documented in ED Course. Risk  Prescription drug management. Disposition  Final diagnoses:   None     ED Disposition       None          Follow-up Information    None         Patient's Medications   Discharge Prescriptions    No medications on file       No discharge procedures on file.     PDMP Review       None            ED Provider  Electronically Signed by             Peter Reece DO  11/14/23 4441

## 2023-11-14 NOTE — ED CARE HANDOFF
Emergency Department Sign Out Note        Sign out and transfer of care from Dr. Theresa Farfan. See Separate Emergency Department note. The patient, Terril Hamman, was evaluated by the previous provider for chest/abd trauma, fall. Workup Completed:  CT chest abd pelvis    ED Course / Workup Pending (followup):  CT read, reeval.          0610: CT reviewed. Findings of 2 minimally displaced rib fractures left side, no PTX. Awaiting official rad read. 0700: CT official read reviewed with patient and spouse. Incentive spirometer given. Pain well controlled. Stable for discharge. Procedures  Medical Decision Making  Amount and/or Complexity of Data Reviewed  Radiology: ordered. Risk  Prescription drug management. Disposition  Final diagnoses:   Multiple rib fractures - left 5-7th ribs   Fall, initial encounter     Time reflects when diagnosis was documented in both MDM as applicable and the Disposition within this note       Time User Action Codes Description Comment    11/14/2023  6:10 AM Cordella Fair Add [S22.49XA] Multiple rib fractures     11/14/2023  6:10 AM Cordella Fair Add [C01. HIBP] Fall, initial encounter     11/14/2023  6:58 AM Cordella Fair Modify [S22.49XA] Multiple rib fractures left 5-7th ribs          ED Disposition       ED Disposition   Discharge    Condition   Stable    Date/Time   Tue Nov 14, 2023  6:58 AM    Comment   Terril Hamman discharge to home/self care.                    Follow-up Information       Follow up With Specialties Details Why [address], DO Family Medicine Schedule an appointment as soon as possible for a visit   [address]  [facility] [address]  [phone]            Discharge Medication List as of 11/14/2023  7:00 AM        START taking these medications    Details   HYDROcodone-acetaminophen (Norco) 5-325 mg per tablet Take 1 tablet by mouth every 6 (six) hours as needed for pain for up to 10 days Max Daily Amount: 4 tablets, Starting Tue 11/14/2023, Until Fri 11/24/2023 at 2359, Normal           CONTINUE these medications which have NOT CHANGED    Details   albuterol (2.5 mg/3 mL) 0.083 % nebulizer solution Take 3 mL (2.5 mg total) by nebulization every 6 (six) hours as needed for shortness of breath Dispense 60 vials, Starting Mon 8/23/2021, Normal      amLODIPine (NORVASC) 10 mg tablet Take 10 mg by mouth daily, Historical Med      Ascorbic Acid (vitamin C) 100 MG tablet Take 100 mg by mouth daily, Historical Med      cholecalciferol (VITAMIN D3) 1,000 units tablet Take 1,000 Units by mouth daily, Historical Med      magnesium 30 MG tablet Take 30 mg by mouth 2 (two) times a day, Historical Med      metoprolol tartrate (LOPRESSOR) 50 mg tablet Take 50 mg by mouth every 12 (twelve) hours, Historical Med      pantoprazole (PROTONIX) 40 mg tablet Take 40 mg by mouth 2 (two) times a day, Historical Med      spironolactone (ALDACTONE) 25 mg tablet Take 25 mg by mouth daily, Historical Med      zinc gluconate 50 mg tablet Take 50 mg by mouth daily, Historical Med           No discharge procedures on file.        ED Provider  Electronically Signed by     Martin Bowie MD  11/14/23 9624

## 2024-02-12 ENCOUNTER — APPOINTMENT (EMERGENCY)
Dept: CT IMAGING | Facility: HOSPITAL | Age: 64
DRG: 683 | End: 2024-02-12
Payer: MEDICARE

## 2024-02-12 ENCOUNTER — HOSPITAL ENCOUNTER (INPATIENT)
Facility: HOSPITAL | Age: 64
LOS: 5 days | Discharge: HOME HEALTH | DRG: 683 | End: 2024-02-17
Attending: EMERGENCY MEDICINE | Admitting: FAMILY MEDICINE
Payer: MEDICARE

## 2024-02-12 ENCOUNTER — APPOINTMENT (EMERGENCY)
Dept: RADIOLOGY | Facility: HOSPITAL | Age: 64
DRG: 683 | End: 2024-02-12
Payer: MEDICARE

## 2024-02-12 DIAGNOSIS — G47.00 INSOMNIA: ICD-10-CM

## 2024-02-12 DIAGNOSIS — N17.9 AKI (ACUTE KIDNEY INJURY) (HCC): Primary | ICD-10-CM

## 2024-02-12 DIAGNOSIS — N30.00 ACUTE CYSTITIS WITHOUT HEMATURIA: ICD-10-CM

## 2024-02-12 DIAGNOSIS — K21.9 GERD (GASTROESOPHAGEAL REFLUX DISEASE): ICD-10-CM

## 2024-02-12 DIAGNOSIS — R78.81 BACTEREMIA: ICD-10-CM

## 2024-02-12 DIAGNOSIS — K21.9 GASTROESOPHAGEAL REFLUX DISEASE WITHOUT ESOPHAGITIS: ICD-10-CM

## 2024-02-12 DIAGNOSIS — E87.1 HYPONATREMIA: ICD-10-CM

## 2024-02-12 PROBLEM — R53.1 GENERALIZED WEAKNESS: Status: ACTIVE | Noted: 2024-02-12

## 2024-02-12 PROBLEM — E87.8 ELECTROLYTE DISTURBANCE: Status: ACTIVE | Noted: 2024-02-12

## 2024-02-12 PROBLEM — I10 HYPERTENSION: Status: ACTIVE | Noted: 2024-02-12

## 2024-02-12 PROBLEM — R10.9 ABDOMINAL PAIN: Status: ACTIVE | Noted: 2024-02-12

## 2024-02-12 LAB
2HR DELTA HS TROPONIN: -1 NG/L
4HR DELTA HS TROPONIN: 0 NG/L
ALBUMIN SERPL BCP-MCNC: 3.6 G/DL (ref 3.5–5)
ALP SERPL-CCNC: 102 U/L (ref 34–104)
ALT SERPL W P-5'-P-CCNC: 49 U/L (ref 7–52)
ANION GAP SERPL CALCULATED.3IONS-SCNC: 16 MMOL/L
AST SERPL W P-5'-P-CCNC: 96 U/L (ref 13–39)
BASOPHILS # BLD AUTO: 0.01 THOUSANDS/ÂΜL (ref 0–0.1)
BASOPHILS NFR BLD AUTO: 0 % (ref 0–1)
BILIRUB SERPL-MCNC: 2.37 MG/DL (ref 0.2–1)
BNP SERPL-MCNC: 48 PG/ML (ref 0–100)
BUN SERPL-MCNC: 28 MG/DL (ref 5–25)
CALCIUM SERPL-MCNC: 9.5 MG/DL (ref 8.4–10.2)
CARDIAC TROPONIN I PNL SERPL HS: 3 NG/L
CARDIAC TROPONIN I PNL SERPL HS: 4 NG/L
CARDIAC TROPONIN I PNL SERPL HS: 4 NG/L
CHLORIDE SERPL-SCNC: 91 MMOL/L (ref 96–108)
CO2 SERPL-SCNC: 22 MMOL/L (ref 21–32)
CREAT SERPL-MCNC: 2.16 MG/DL (ref 0.6–1.3)
EOSINOPHIL # BLD AUTO: 0.01 THOUSAND/ÂΜL (ref 0–0.61)
EOSINOPHIL NFR BLD AUTO: 0 % (ref 0–6)
ERYTHROCYTE [DISTWIDTH] IN BLOOD BY AUTOMATED COUNT: 13.4 % (ref 11.6–15.1)
FLUAV RNA RESP QL NAA+PROBE: NEGATIVE
FLUBV RNA RESP QL NAA+PROBE: NEGATIVE
GFR SERPL CREATININE-BSD FRML MDRD: 23 ML/MIN/1.73SQ M
GLUCOSE SERPL-MCNC: 161 MG/DL (ref 65–140)
HCT VFR BLD AUTO: 33.7 % (ref 34.8–46.1)
HGB BLD-MCNC: 11.8 G/DL (ref 11.5–15.4)
IMM GRANULOCYTES # BLD AUTO: 0.03 THOUSAND/UL (ref 0–0.2)
IMM GRANULOCYTES NFR BLD AUTO: 0 % (ref 0–2)
LACTATE SERPL-SCNC: 1.1 MMOL/L (ref 0.5–2)
LIPASE SERPL-CCNC: 172 U/L (ref 11–82)
LYMPHOCYTES # BLD AUTO: 0.7 THOUSANDS/ÂΜL (ref 0.6–4.47)
LYMPHOCYTES NFR BLD AUTO: 10 % (ref 14–44)
MAGNESIUM SERPL-MCNC: 1.7 MG/DL (ref 1.9–2.7)
MCH RBC QN AUTO: 39.1 PG (ref 26.8–34.3)
MCHC RBC AUTO-ENTMCNC: 35 G/DL (ref 31.4–37.4)
MCV RBC AUTO: 112 FL (ref 82–98)
MONOCYTES # BLD AUTO: 1.31 THOUSAND/ÂΜL (ref 0.17–1.22)
MONOCYTES NFR BLD AUTO: 19 % (ref 4–12)
NEUTROPHILS # BLD AUTO: 4.9 THOUSANDS/ÂΜL (ref 1.85–7.62)
NEUTS SEG NFR BLD AUTO: 71 % (ref 43–75)
NRBC BLD AUTO-RTO: 0 /100 WBCS
OSMOLALITY UR/SERPL-RTO: 281 MMOL/KG (ref 282–298)
PLATELET # BLD AUTO: 172 THOUSANDS/UL (ref 149–390)
PMV BLD AUTO: 10.3 FL (ref 8.9–12.7)
POTASSIUM SERPL-SCNC: 3.2 MMOL/L (ref 3.5–5.3)
PROT SERPL-MCNC: 7.3 G/DL (ref 6.4–8.4)
RBC # BLD AUTO: 3.02 MILLION/UL (ref 3.81–5.12)
RSV RNA RESP QL NAA+PROBE: NEGATIVE
SARS-COV-2 RNA RESP QL NAA+PROBE: NEGATIVE
SODIUM SERPL-SCNC: 129 MMOL/L (ref 135–147)
TSH SERPL DL<=0.05 MIU/L-ACNC: 2.65 UIU/ML (ref 0.45–4.5)
WBC # BLD AUTO: 6.96 THOUSAND/UL (ref 4.31–10.16)

## 2024-02-12 PROCEDURE — 83880 ASSAY OF NATRIURETIC PEPTIDE: CPT | Performed by: EMERGENCY MEDICINE

## 2024-02-12 PROCEDURE — 36415 COLL VENOUS BLD VENIPUNCTURE: CPT | Performed by: EMERGENCY MEDICINE

## 2024-02-12 PROCEDURE — 85025 COMPLETE CBC W/AUTO DIFF WBC: CPT | Performed by: EMERGENCY MEDICINE

## 2024-02-12 PROCEDURE — 0DB98ZX EXCISION OF DUODENUM, VIA NATURAL OR ARTIFICIAL OPENING ENDOSCOPIC, DIAGNOSTIC: ICD-10-PCS | Performed by: INTERNAL MEDICINE

## 2024-02-12 PROCEDURE — 0241U HB NFCT DS VIR RESP RNA 4 TRGT: CPT | Performed by: EMERGENCY MEDICINE

## 2024-02-12 PROCEDURE — 87040 BLOOD CULTURE FOR BACTERIA: CPT | Performed by: EMERGENCY MEDICINE

## 2024-02-12 PROCEDURE — 74176 CT ABD & PELVIS W/O CONTRAST: CPT

## 2024-02-12 PROCEDURE — 83930 ASSAY OF BLOOD OSMOLALITY: CPT

## 2024-02-12 PROCEDURE — 83690 ASSAY OF LIPASE: CPT | Performed by: EMERGENCY MEDICINE

## 2024-02-12 PROCEDURE — 84484 ASSAY OF TROPONIN QUANT: CPT

## 2024-02-12 PROCEDURE — 0DB78ZX EXCISION OF STOMACH, PYLORUS, VIA NATURAL OR ARTIFICIAL OPENING ENDOSCOPIC, DIAGNOSTIC: ICD-10-PCS | Performed by: INTERNAL MEDICINE

## 2024-02-12 PROCEDURE — 71045 X-RAY EXAM CHEST 1 VIEW: CPT

## 2024-02-12 PROCEDURE — 87154 CUL TYP ID BLD PTHGN 6+ TRGT: CPT | Performed by: EMERGENCY MEDICINE

## 2024-02-12 PROCEDURE — 84443 ASSAY THYROID STIM HORMONE: CPT

## 2024-02-12 PROCEDURE — 99285 EMERGENCY DEPT VISIT HI MDM: CPT | Performed by: EMERGENCY MEDICINE

## 2024-02-12 PROCEDURE — 83735 ASSAY OF MAGNESIUM: CPT | Performed by: EMERGENCY MEDICINE

## 2024-02-12 PROCEDURE — 84484 ASSAY OF TROPONIN QUANT: CPT | Performed by: EMERGENCY MEDICINE

## 2024-02-12 PROCEDURE — 83605 ASSAY OF LACTIC ACID: CPT | Performed by: EMERGENCY MEDICINE

## 2024-02-12 PROCEDURE — 99223 1ST HOSP IP/OBS HIGH 75: CPT | Performed by: FAMILY MEDICINE

## 2024-02-12 PROCEDURE — G1004 CDSM NDSC: HCPCS

## 2024-02-12 PROCEDURE — 93005 ELECTROCARDIOGRAM TRACING: CPT

## 2024-02-12 PROCEDURE — 87077 CULTURE AEROBIC IDENTIFY: CPT | Performed by: EMERGENCY MEDICINE

## 2024-02-12 PROCEDURE — 96360 HYDRATION IV INFUSION INIT: CPT

## 2024-02-12 PROCEDURE — 87186 SC STD MICRODIL/AGAR DIL: CPT | Performed by: EMERGENCY MEDICINE

## 2024-02-12 PROCEDURE — 80053 COMPREHEN METABOLIC PANEL: CPT | Performed by: EMERGENCY MEDICINE

## 2024-02-12 PROCEDURE — 99285 EMERGENCY DEPT VISIT HI MDM: CPT

## 2024-02-12 RX ORDER — MAGNESIUM HYDROXIDE/ALUMINUM HYDROXICE/SIMETHICONE 120; 1200; 1200 MG/30ML; MG/30ML; MG/30ML
30 SUSPENSION ORAL EVERY 6 HOURS PRN
Status: DISCONTINUED | OUTPATIENT
Start: 2024-02-12 | End: 2024-02-17 | Stop reason: HOSPADM

## 2024-02-12 RX ORDER — ONDANSETRON 2 MG/ML
4 INJECTION INTRAMUSCULAR; INTRAVENOUS EVERY 6 HOURS PRN
Status: DISCONTINUED | OUTPATIENT
Start: 2024-02-12 | End: 2024-02-12

## 2024-02-12 RX ORDER — CLONAZEPAM 0.5 MG/1
0.5 TABLET ORAL
Status: DISCONTINUED | OUTPATIENT
Start: 2024-02-12 | End: 2024-02-17 | Stop reason: HOSPADM

## 2024-02-12 RX ORDER — MAGNESIUM SULFATE HEPTAHYDRATE 40 MG/ML
2 INJECTION, SOLUTION INTRAVENOUS ONCE
Status: COMPLETED | OUTPATIENT
Start: 2024-02-12 | End: 2024-02-12

## 2024-02-12 RX ORDER — ACETAMINOPHEN 325 MG/1
650 TABLET ORAL EVERY 6 HOURS PRN
Status: DISCONTINUED | OUTPATIENT
Start: 2024-02-12 | End: 2024-02-17 | Stop reason: HOSPADM

## 2024-02-12 RX ORDER — METOPROLOL TARTRATE 50 MG/1
50 TABLET, FILM COATED ORAL EVERY 12 HOURS SCHEDULED
Status: DISCONTINUED | OUTPATIENT
Start: 2024-02-12 | End: 2024-02-12

## 2024-02-12 RX ORDER — AMLODIPINE BESYLATE 10 MG/1
10 TABLET ORAL DAILY
Status: DISCONTINUED | OUTPATIENT
Start: 2024-02-13 | End: 2024-02-12

## 2024-02-12 RX ORDER — SODIUM CHLORIDE, SODIUM GLUCONATE, SODIUM ACETATE, POTASSIUM CHLORIDE, MAGNESIUM CHLORIDE, SODIUM PHOSPHATE, DIBASIC, AND POTASSIUM PHOSPHATE .53; .5; .37; .037; .03; .012; .00082 G/100ML; G/100ML; G/100ML; G/100ML; G/100ML; G/100ML; G/100ML
75 INJECTION, SOLUTION INTRAVENOUS CONTINUOUS
Status: DISCONTINUED | OUTPATIENT
Start: 2024-02-12 | End: 2024-02-17 | Stop reason: HOSPADM

## 2024-02-12 RX ORDER — ALBUTEROL SULFATE 2.5 MG/3ML
2.5 SOLUTION RESPIRATORY (INHALATION) EVERY 6 HOURS PRN
Status: DISCONTINUED | OUTPATIENT
Start: 2024-02-12 | End: 2024-02-17 | Stop reason: HOSPADM

## 2024-02-12 RX ORDER — MELATONIN
1000 DAILY
Status: DISCONTINUED | OUTPATIENT
Start: 2024-02-13 | End: 2024-02-17 | Stop reason: HOSPADM

## 2024-02-12 RX ORDER — ENOXAPARIN SODIUM 100 MG/ML
40 INJECTION SUBCUTANEOUS DAILY
Status: DISCONTINUED | OUTPATIENT
Start: 2024-02-13 | End: 2024-02-12

## 2024-02-12 RX ORDER — BUPROPION HYDROCHLORIDE 150 MG/1
300 TABLET ORAL DAILY
Status: DISCONTINUED | OUTPATIENT
Start: 2024-02-13 | End: 2024-02-17 | Stop reason: HOSPADM

## 2024-02-12 RX ORDER — HEPARIN SODIUM 5000 [USP'U]/ML
5000 INJECTION, SOLUTION INTRAVENOUS; SUBCUTANEOUS EVERY 8 HOURS SCHEDULED
Status: DISCONTINUED | OUTPATIENT
Start: 2024-02-12 | End: 2024-02-14

## 2024-02-12 RX ORDER — RIBOFLAVIN (VITAMIN B2) 100 MG
100 TABLET ORAL DAILY
Status: DISCONTINUED | OUTPATIENT
Start: 2024-02-13 | End: 2024-02-12

## 2024-02-12 RX ORDER — POTASSIUM CHLORIDE 20MEQ/15ML
40 LIQUID (ML) ORAL ONCE
Status: COMPLETED | OUTPATIENT
Start: 2024-02-12 | End: 2024-02-12

## 2024-02-12 RX ORDER — METOPROLOL SUCCINATE 50 MG/1
50 TABLET, EXTENDED RELEASE ORAL 2 TIMES DAILY
Status: DISCONTINUED | OUTPATIENT
Start: 2024-02-12 | End: 2024-02-17 | Stop reason: HOSPADM

## 2024-02-12 RX ADMIN — MAGNESIUM SULFATE HEPTAHYDRATE 2 G: 40 INJECTION, SOLUTION INTRAVENOUS at 18:33

## 2024-02-12 RX ADMIN — METOPROLOL SUCCINATE 50 MG: 50 TABLET, EXTENDED RELEASE ORAL at 20:33

## 2024-02-12 RX ADMIN — SODIUM CHLORIDE, SODIUM GLUCONATE, SODIUM ACETATE, POTASSIUM CHLORIDE, MAGNESIUM CHLORIDE, SODIUM PHOSPHATE, DIBASIC, AND POTASSIUM PHOSPHATE 75 ML/HR: .53; .5; .37; .037; .03; .012; .00082 INJECTION, SOLUTION INTRAVENOUS at 19:56

## 2024-02-12 RX ADMIN — HEPARIN SODIUM 5000 UNITS: 5000 INJECTION INTRAVENOUS; SUBCUTANEOUS at 20:33

## 2024-02-12 RX ADMIN — POTASSIUM CHLORIDE 40 MEQ: 1.5 SOLUTION ORAL at 18:33

## 2024-02-12 RX ADMIN — CLONAZEPAM 0.5 MG: 0.5 TABLET ORAL at 20:33

## 2024-02-12 RX ADMIN — SODIUM CHLORIDE 1000 ML: 0.9 INJECTION, SOLUTION INTRAVENOUS at 14:38

## 2024-02-12 NOTE — H&P
[facility]  H&P  Name: Lizette Cummings 63 y.o. female I MRN: [id number]  Unit/Bed#: ED 09 I Date of Admission: 2/12/2024   Date of Service: 2/12/2024 I Hospital Day: 0      Assessment/Plan   * MAXI (acute kidney injury) (HCC)  Assessment & Plan  Creatine on admission 2.16, Baseline creatinine normal (0.8)  Etiology poor oral intake secondary to severe reflux and nausea  Imaging CT abdomen pelvis unrevealing for acute pathology, moderate hiatal hernia    Lab Results   Component Value Date    CREATININE 2.16 (H) 02/12/2024    CREATININE 0.80 09/14/2021     Hold spironolactone  Avoid hypotension, nephrotoxins, and NSAIDS if possible    Urine studies orders - calculate FeNA   IV fluids   Strict I & Os       Electrolyte disturbance  Assessment & Plan  Both K and Mg low   Patient declines IV K, will try oral solution   Mag IV ordered    Insomnia  Assessment & Plan  Patient reports insomnia   Usually takes klonopin - PDMP reviewed. Prescribed by PCP reports not taking every night   Will order PRN     GERD (gastroesophageal reflux disease)  Assessment & Plan  Patient has longstanding history of GERD with moderate-sized hiatal hernia  She takes Protonix 40 mg twice daily  Has seen otolaryngology in the past for chronic cough and was recommended to see GI, has not followed up  Last EGD around 20 to 30 years ago    Consult GI  Patient with constant gagging with food and regurgitation  N.p.o. for possible EGD tomorrow    Generalized weakness  Assessment & Plan  Patient endorses some generalized weakness and fatigue likely secondary to poor oral intake  PT/OT consults    Hypertension  Assessment & Plan  Continue metoprolol (decreased dose to avoid hypotension) hold spironolactone     Hyponatremia  Assessment & Plan  Presents to ED with poor oral intake   Sodium on admission 129 (corrected for hyperglycemia is 130)  Hypovolemic on initial exam   Potential etiology poor oral intake   Urine sodium,  "urine creatinine, urine osmo, and serum osmo ordered  TSH ordered     IVF gentle  BMP BID    Lab Results   Component Value Date    SODIUM 129 (L) 02/12/2024    SODIUM 143 09/14/2021              VTE Pharmacologic Prophylaxis:   Moderate Risk (Score 3-4) - Pharmacological DVT Prophylaxis Ordered: heparin.  Code Status: Level 1 - Full Code   Discussion with family: Updated  () at bedside.    Anticipated Length of Stay: Patient will be admitted on an inpatient basis with an anticipated length of stay of greater than 2 midnights secondary to MAXI an electrolyte abnormality .    Total Time Spent on Date of Encounter in care of patient:  mins. This time was spent on one or more of the following: performing physical exam; counseling and coordination of care; obtaining or reviewing history; documenting in the medical record; reviewing/ordering tests, medications or procedures; communicating with other healthcare professionals and discussing with patient's family/caregivers.    Chief Complaint: Vomiting     History of Present Illness:  Lizette Cummings is a 63 y.o. female with a PMH of GERD, hiatal hernia, chronic cough post COVID 8/2021, HTN who presents with difficulty with oral intake. Has been increasingly weak, has chronic cough which causes abdominal muscle pain, has trouble eating anything stating she \"regurgitates\" most food. Was seen by otolaryngology in January 2023 and recommended to see GI, has not follow up. Very little oral intake at home. Presents with electrolyte abnormality and MAXI. No recent illness, has chronic cough since COVID in Aug 2021.    Review of Systems:  Review of Systems   Constitutional:  Positive for fatigue. Negative for activity change, chills and fever.   HENT:  Positive for postnasal drip and trouble swallowing. Negative for congestion, ear pain, rhinorrhea and sore throat.    Eyes:  Negative for pain and visual disturbance.   Respiratory:  Negative for cough " (chronic), chest tightness and shortness of breath.    Cardiovascular:  Negative for chest pain and palpitations.   Gastrointestinal:  Positive for abdominal pain (with cough), nausea and vomiting. Negative for constipation and diarrhea.   Genitourinary:  Negative for difficulty urinating, dysuria, frequency, hematuria and urgency.   Musculoskeletal:  Negative for arthralgias, back pain and myalgias.   Skin:  Negative for color change and rash.   Neurological:  Positive for weakness. Negative for seizures, syncope and headaches.   All other systems reviewed and are negative.      Past Medical and Surgical History:   Past Medical History:   Diagnosis Date    Anxiety     Diverticular disease     GERD (gastroesophageal reflux disease)     Hypertension        Past Surgical History:   Procedure Laterality Date    BOWEL RESECTION      HERNIA REPAIR      KNEE ARTHROSCOPY      SHOULDER SURGERY Left        Meds/Allergies:  Prior to Admission medications    Medication Sig Start Date End Date Taking? Authorizing Provider   albuterol (2.5 mg/3 mL) 0.083 % nebulizer solution Take 3 mL (2.5 mg total) by nebulization every 6 (six) hours as needed for shortness of breath Dispense 60 vials 8/23/21   Jared Chavez MD   amLODIPine (NORVASC) 10 mg tablet Take 10 mg by mouth daily    Historical Provider, MD   Ascorbic Acid (vitamin C) 100 MG tablet Take 100 mg by mouth daily    Historical Provider, MD   cholecalciferol (VITAMIN D3) 1,000 units tablet Take 1,000 Units by mouth daily    Historical Provider, MD   magnesium 30 MG tablet Take 30 mg by mouth 2 (two) times a day    Historical Provider, MD   metoprolol tartrate (LOPRESSOR) 50 mg tablet Take 50 mg by mouth every 12 (twelve) hours    Historical Provider, MD   pantoprazole (PROTONIX) 40 mg tablet Take 40 mg by mouth 2 (two) times a day    Historical Provider, MD   spironolactone (ALDACTONE) 25 mg tablet Take 25 mg by mouth daily    Historical Provider, MD   zinc gluconate 50 mg  "tablet Take 50 mg by mouth daily    Historical Provider, MD     I have reviewed home medications with patient personally.    Allergies:   Allergies   Allergen Reactions    Ace Inhibitors Anaphylaxis    Other Anaphylaxis     arb    Ciprofloxacin Rash    Sulfa Antibiotics Rash       Social History:  Marital Status: /Civil Union   Occupation: Unknown  Patient Pre-hospital Living Situation: Home, With spouse  Patient Pre-hospital Level of Mobility: walks  Patient Pre-hospital Diet Restrictions: None  Substance Use History:   Social History     Substance and Sexual Activity   Alcohol Use Yes    Comment: social     Social History     Tobacco Use   Smoking Status Former    Current packs/day: 0.00    Types: Cigarettes    Quit date:     Years since quittin.1   Smokeless Tobacco Never     Social History     Substance and Sexual Activity   Drug Use Never       Family History:  Family History   Problem Relation Age of Onset    Dementia Mother     Cerebral aneurysm Father        Physical Exam:     Vitals:   Blood Pressure: 135/70 (24 1630)  Pulse: 99 (24 1630)  Temperature: 98.6 °F (37 °C) (24 1335)  Temp Source: Oral (24 1335)  Respirations: 18 (24 1630)  Height: 5' 7\" (170.2 cm) (24 1335)  Weight - Scale: 65.8 kg (145 lb) (24 1335)  SpO2: 97 % (24 1630)    Physical Exam  Vitals and nursing note reviewed.   Constitutional:       General: She is not in acute distress.     Appearance: Normal appearance.   HENT:      Head: Normocephalic and atraumatic.      Nose: No congestion.      Mouth/Throat:      Mouth: Mucous membranes are moist.   Eyes:      Conjunctiva/sclera: Conjunctivae normal.   Cardiovascular:      Rate and Rhythm: Normal rate and regular rhythm.      Pulses: Normal pulses.      Heart sounds: Normal heart sounds. No murmur heard.  Pulmonary:      Effort: Pulmonary effort is normal. No respiratory distress.      Breath sounds: Normal breath sounds. "   Abdominal:      General: Bowel sounds are normal.      Palpations: Abdomen is soft.      Tenderness: There is no abdominal tenderness.   Musculoskeletal:         General: Normal range of motion.      Right lower leg: No edema.      Left lower leg: No edema.   Skin:     General: Skin is warm and dry.   Neurological:      Mental Status: She is alert and oriented to person, place, and time.          Additional Data:     Lab Results:  Results from last 7 days   Lab Units 02/12/24  1438   WBC Thousand/uL 6.96   HEMOGLOBIN g/dL 11.8   HEMATOCRIT % 33.7*   PLATELETS Thousands/uL 172   NEUTROS PCT % 71   LYMPHS PCT % 10*   MONOS PCT % 19*   EOS PCT % 0     Results from last 7 days   Lab Units 02/12/24  1438   SODIUM mmol/L 129*   POTASSIUM mmol/L 3.2*   CHLORIDE mmol/L 91*   CO2 mmol/L 22   BUN mg/dL 28*   CREATININE mg/dL 2.16*   ANION GAP mmol/L 16   CALCIUM mg/dL 9.5   ALBUMIN g/dL 3.6   TOTAL BILIRUBIN mg/dL 2.37*   ALK PHOS U/L 102   ALT U/L 49   AST U/L 96*   GLUCOSE RANDOM mg/dL 161*                 Results from last 7 days   Lab Units 02/12/24  1438   LACTIC ACID mmol/L 1.1       Lines/Drains:  Invasive Devices       Peripheral Intravenous Line  Duration             Peripheral IV 02/12/24 Left Antecubital <1 day                        Imaging: Reviewed radiology reports from this admission including: abdominal/pelvic CT  CT abdomen pelvis wo contrast   Final Result by Mario Morgan DO (02/12 1603)      No acute intra-abdominal abnormality. Chronic findings as described above.      Workstation performed: [id number]         XR chest portable   ED Interpretation by Adan Funes MD (02/12 1511)   No acute finding          EKG and Other Studies Reviewed on Admission:   EKG: NSR. HR 94.    ** Please Note: This note has been constructed using a voice recognition system. **

## 2024-02-12 NOTE — ASSESSMENT & PLAN NOTE
Patient endorses some generalized weakness and fatigue likely secondary to poor oral intake  PT/OT consults

## 2024-02-12 NOTE — ASSESSMENT & PLAN NOTE
Patient reports insomnia   Usually takes klonopin - PDMP reviewed. Prescribed by PCP reports not taking every night   Will order PRN

## 2024-02-12 NOTE — ED PROVIDER NOTES
"History  Chief Complaint   Patient presents with    Weakness - Generalized     Weakness w/ decrease in PO intake; reports she was dx with \"Long [place] covid\" husbands reports tinnitus w/ hallucinations      3 weeks of generalized weakness, fatigue, decreased appetite and decreased oral intake, vomiting, abdominal discomfort.      History provided by:  Patient   used: No    Fatigue  Severity:  Moderate  Onset quality:  Gradual  Duration:  3 weeks  Timing:  Constant  Progression:  Unchanged  Chronicity:  New  Relieved by:  Nothing  Worsened by:  Nothing  Ineffective treatments:  None tried  Associated symptoms: abdominal pain, lethargy, nausea and vomiting    Associated symptoms: no aphasia, no arthralgias, no chest pain, no cough, no diarrhea, no dizziness, no drooling, no dysuria, no numbness in extremities, no fever, no foul-smelling urine, no frequency, no headaches, no hematochezia, no loss of consciousness, no seizures, no shortness of breath, no stroke symptoms and no syncope        Prior to Admission Medications   Prescriptions Last Dose Informant Patient Reported? Taking?   Ascorbic Acid (vitamin C) 100 MG tablet   Yes No   Sig: Take 100 mg by mouth daily   albuterol (2.5 mg/3 mL) 0.083 % nebulizer solution   No No   Sig: Take 3 mL (2.5 mg total) by nebulization every 6 (six) hours as needed for shortness of breath Dispense 60 vials   amLODIPine (NORVASC) 10 mg tablet   Yes No   Sig: Take 10 mg by mouth daily   cholecalciferol (VITAMIN D3) 1,000 units tablet   Yes No   Sig: Take 1,000 Units by mouth daily   magnesium 30 MG tablet   Yes No   Sig: Take 30 mg by mouth 2 (two) times a day   metoprolol tartrate (LOPRESSOR) 50 mg tablet   Yes No   Sig: Take 50 mg by mouth every 12 (twelve) hours   pantoprazole (PROTONIX) 40 mg tablet   Yes No   Sig: Take 40 mg by mouth 2 (two) times a day   spironolactone (ALDACTONE) 25 mg tablet   Yes No   Sig: Take 25 mg by mouth daily   zinc gluconate 50 mg " tablet   Yes No   Sig: Take 50 mg by mouth daily      Facility-Administered Medications: None       Past Medical History:   Diagnosis Date    Anxiety     Diverticular disease     GERD (gastroesophageal reflux disease)     Hypertension        Past Surgical History:   Procedure Laterality Date    BOWEL RESECTION      HERNIA REPAIR      KNEE ARTHROSCOPY      SHOULDER SURGERY Left        Family History   Problem Relation Age of Onset    Dementia Mother     Cerebral aneurysm Father      I have reviewed and agree with the history as documented.    E-Cigarette/Vaping    E-Cigarette Use Never User      E-Cigarette/Vaping Substances     Social History     Tobacco Use    Smoking status: Former     Current packs/day: 0.00     Types: Cigarettes     Quit date:      Years since quittin.1    Smokeless tobacco: Never   Vaping Use    Vaping status: Never Used   Substance Use Topics    Alcohol use: Yes     Comment: social    Drug use: Never       Review of Systems   Constitutional:  Positive for fatigue. Negative for chills and fever.   HENT:  Negative for drooling, ear pain, hearing loss, sore throat, trouble swallowing and voice change.    Eyes:  Negative for pain and discharge.   Respiratory:  Negative for cough, shortness of breath and wheezing.    Cardiovascular:  Negative for chest pain, palpitations and syncope.   Gastrointestinal:  Positive for abdominal pain, nausea and vomiting. Negative for blood in stool, constipation, diarrhea and hematochezia.   Genitourinary:  Negative for dysuria, flank pain, frequency and hematuria.   Musculoskeletal:  Negative for arthralgias, joint swelling, neck pain and neck stiffness.   Skin:  Negative for rash and wound.   Neurological:  Negative for dizziness, seizures, loss of consciousness, syncope, facial asymmetry and headaches.   Psychiatric/Behavioral:  Negative for hallucinations, self-injury and suicidal ideas.    All other systems reviewed and are negative.      Physical  Exam  Physical Exam  Vitals and nursing note reviewed.   Constitutional:       General: She is not in acute distress.     Appearance: She is well-developed.   HENT:      Head: Normocephalic and atraumatic.      Right Ear: External ear normal.      Left Ear: External ear normal.   Eyes:      General: No scleral icterus.        Right eye: No discharge.         Left eye: No discharge.      Extraocular Movements: Extraocular movements intact.      Conjunctiva/sclera: Conjunctivae normal.   Cardiovascular:      Rate and Rhythm: Regular rhythm. Tachycardia present.      Heart sounds: Normal heart sounds. No murmur heard.  Pulmonary:      Effort: Pulmonary effort is normal.      Breath sounds: Normal breath sounds. No wheezing or rales.   Abdominal:      General: Bowel sounds are normal. There is no distension.      Palpations: Abdomen is soft.      Tenderness: There is no abdominal tenderness. There is no guarding or rebound.   Musculoskeletal:         General: No deformity. Normal range of motion.      Cervical back: Normal range of motion and neck supple.   Skin:     General: Skin is warm and dry.      Findings: No rash.   Neurological:      General: No focal deficit present.      Mental Status: She is alert and oriented to person, place, and time.      Cranial Nerves: No cranial nerve deficit.   Psychiatric:         Mood and Affect: Mood normal.         Behavior: Behavior normal.         Thought Content: Thought content normal.         Judgment: Judgment normal.         Vital Signs  ED Triage Vitals [02/12/24 1335]   Temperature Pulse Respirations Blood Pressure SpO2   98.6 °F (37 °C) (!) 113 18 141/85 98 %      Temp Source Heart Rate Source Patient Position - Orthostatic VS BP Location FiO2 (%)   Oral Monitor Sitting Right arm --      Pain Score       3           Vitals:    02/12/24 1335 02/12/24 1445 02/12/24 1530   BP: 141/85 140/62 146/67   Pulse: (!) 113 91 89   Patient Position - Orthostatic VS: Sitting Sitting  Lying         Visual Acuity      ED Medications  Medications   sodium chloride 0.9 % bolus 1,000 mL (1,000 mL Intravenous New Bag 2/12/24 1438)       Diagnostic Studies  Results Reviewed       Procedure Component Value Units Date/Time    COVID19, Influenza A/B, RSV PCR, [facility] [[id number]]  (Normal) Collected: 02/12/24 1438    Lab Status: Final result Specimen: Nares from Nasopharyngeal Swab Updated: 02/12/24 1528     SARS-CoV-2 Negative     INFLUENZA A PCR Negative     INFLUENZA B PCR Negative     RSV PCR Negative    Narrative:      FOR PEDIATRIC PATIENTS - copy/paste COVID Guidelines URL to browser: [email or web address]    SARS-CoV-2 assay is a Nucleic Acid Amplification assay intended for the  qualitative detection of nucleic acid from SARS-CoV-2 in nasopharyngeal  swabs. Results are for the presumptive identification of SARS-CoV-2 RNA.    Positive results are indicative of infection with SARS-CoV-2, the virus  causing COVID-19, but do not rule out bacterial infection or co-infection  with other viruses. Laboratories within the [place] and its  territories are required to report all positive results to the appropriate  public health authorities. Negative results do not preclude SARS-CoV-2  infection and should not be used as the sole basis for treatment or other  patient management decisions. Negative results must be combined with  clinical observations, patient history, and epidemiological information.  This test has not been FDA cleared or approved.    This test has been authorized by FDA under an Emergency Use Authorization  (EUA). This test is only authorized for the duration of time the  declaration that circumstances exist justifying the authorization of the  emergency use of an in vitro diagnostic tests for detection of SARS-CoV-2  virus and/or diagnosis of COVID-19 infection under section 564(b)(1) of  the Act, 21 U.S.C. 360bbb-3(b)(1), unless the  authorization is terminated  or revoked sooner. The test has been validated but independent review by FDA  and CLIA is pending.    Test performed using [employer] GeneXpert: This RT-PCR assay targets N2,  a region unique to SARS-CoV-2. A conserved region in the E-gene was chosen  for pan-Sarbecovirus detection which includes SARS-CoV-2.    According to CMS-2020-01-R, this platform meets the definition of high-throughput technology.    HS Troponin 0hr (reflex protocol) [[id number]]  (Normal) Collected: 02/12/24 1438    Lab Status: Final result Specimen: Blood from Arm, Left Updated: 02/12/24 1510     hs TnI 0hr 4 ng/L     HS Troponin I 2hr [[id number]]     Lab Status: No result Specimen: Blood     Lactic acid, plasma (w/reflex if result > 2.0) [[id number]]  (Normal) Collected: 02/12/24 1438    Lab Status: Final result Specimen: Blood from Arm, Left Updated: 02/12/24 1504     LACTIC ACID 1.1 mmol/L     Narrative:      Result may be elevated if tourniquet was used during collection.    Comprehensive metabolic panel [[id number]]  (Abnormal) Collected: 02/12/24 1438    Lab Status: Final result Specimen: Blood from Arm, Left Updated: 02/12/24 1503     Sodium 129 mmol/L      Potassium 3.2 mmol/L      Chloride 91 mmol/L      CO2 22 mmol/L      ANION GAP 16 mmol/L      BUN 28 mg/dL      Creatinine 2.16 mg/dL      Glucose 161 mg/dL      Calcium 9.5 mg/dL      AST 96 U/L      ALT 49 U/L      Alkaline Phosphatase 102 U/L      Total Protein 7.3 g/dL      Albumin 3.6 g/dL      Total Bilirubin 2.37 mg/dL      eGFR 23 ml/min/1.73sq m     Narrative:      National Kidney Disease Foundation guidelines for Chronic Kidney Disease (CKD):     Stage 1 with normal or high GFR (GFR > 90 mL/min/1.73 square meters)    Stage 2 Mild CKD (GFR = 60-89 mL/min/1.73 square meters)    Stage 3A Moderate CKD (GFR = 45-59 mL/min/1.73 square meters)    Stage 3B Moderate CKD (GFR = 30-44 mL/min/1.73 square meters)    Stage 4 Severe CKD (GFR = 15-29 mL/min/1.73  square meters)    Stage 5 End Stage CKD (GFR <15 mL/min/1.73 square meters)  Note: GFR calculation is accurate only with a steady state creatinine    Lipase [[id number]]  (Abnormal) Collected: 02/12/24 1438    Lab Status: Final result Specimen: Blood from Arm, Left Updated: 02/12/24 1503     Lipase 172 u/L     Magnesium [[id number]]  (Abnormal) Collected: 02/12/24 1438    Lab Status: Final result Specimen: Blood from Arm, Left Updated: 02/12/24 1503     Magnesium 1.7 mg/dL     CBC and differential [[id number]]  (Abnormal) Collected: 02/12/24 1438    Lab Status: Final result Specimen: Blood from Arm, Left Updated: 02/12/24 1450     WBC 6.96 Thousand/uL      RBC 3.02 Million/uL      Hemoglobin 11.8 g/dL      Hematocrit 33.7 %       fL      MCH 39.1 pg      MCHC 35.0 g/dL      RDW 13.4 %      MPV 10.3 fL      Platelets 172 Thousands/uL      nRBC 0 /100 WBCs      Neutrophils Relative 71 %      Immat GRANS % 0 %      Lymphocytes Relative 10 %      Monocytes Relative 19 %      Eosinophils Relative 0 %      Basophils Relative 0 %      Neutrophils Absolute 4.90 Thousands/µL      Immature Grans Absolute 0.03 Thousand/uL      Lymphocytes Absolute 0.70 Thousands/µL      Monocytes Absolute 1.31 Thousand/µL      Eosinophils Absolute 0.01 Thousand/µL      Basophils Absolute 0.01 Thousands/µL     B-Type Natriuretic Peptide(BNP) [[id number]] Collected: 02/12/24 1438    Lab Status: In process Specimen: Blood from Arm, Left Updated: 02/12/24 1446    Blood culture #2 [[id number]] Collected: 02/12/24 1438    Lab Status: In process Specimen: Blood from Hand, Right Updated: 02/12/24 1445    Blood culture #1 [[id number]] Collected: 02/12/24 1438    Lab Status: In process Specimen: Blood from Arm, Left Updated: 02/12/24 1445    UA w Reflex to Microscopic w Reflex to Culture [[id number]]     Lab Status: No result Specimen: Urine                    XR chest portable   ED Interpretation by Adan Funes MD (02/12 1511)   No acute finding       CT abdomen pelvis wo contrast    (Results Pending)              Procedures  ECG 12 Lead Documentation Only    Date/Time: 2/12/2024 2:44 PM    Performed by: Adan Funes MD  Authorized by: Adan Funes MD    ECG reviewed by me, the ED Provider: yes    Patient location:  ED  Previous ECG:     Previous ECG:  Unavailable  Interpretation:     Interpretation: non-specific    Rate:     ECG rate:  94    ECG rate assessment: normal    Rhythm:     Rhythm: sinus rhythm    Ectopy:     Ectopy: none    QRS:     QRS axis:  Normal    QRS intervals:  Normal  Conduction:     Conduction: normal    ST segments:     ST segments:  Normal  T waves:     T waves: non-specific             ED Course                               SBIRT 22yo+      Flowsheet Row Most Recent Value   Initial Alcohol Screen: US AUDIT-C     1. How often do you have a drink containing alcohol? 1 Filed at: 02/12/2024 5141   2. How many drinks containing alcohol do you have on a typical day you are drinking?  0 Filed at: 02/12/2024 4905   3a. Male UNDER 65: How often do you have five or more drinks on one occasion? 0 Filed at: 02/12/2024 8113   3b. FEMALE Any Age, or MALE 65+: How often do you have 4 or more drinks on one occassion? 0 Filed at: 02/12/2024 1337   Audit-C Score 1 Filed at: 02/12/2024 7337   ROLANDO: How many times in the past year have you...    Used an illegal drug or used a prescription medication for non-medical reasons? Never Filed at: 02/12/2024 0703                      Medical Decision Making  Patient presents to the emergency department with generalized weakness, vomiting dehydration.      Based on the MSE and the history provided, diagnostic considerations include but are not limited to dehydration, electrolyte abnormality, UTI, gastroenteritis, colitis, diverticulitis, other intra-abdominal pathology    Based on the work-up performed in the emergency department which includes physical examination, laboratory testing, imaging which  may include advanced imaging as necessary such as CT scan or ultrasound, it is deemed that the patient will require admission to the hospital for treatment of MAXI and hyponatremia.  Patient noted to have hyponatremia with moderate MAXI, will admit to medical service for same.    Amount and/or Complexity of Data Reviewed  Labs: ordered. Decision-making details documented in ED Course.     Details: MAXI and hyponatremia  Radiology: ordered and independent interpretation performed. Decision-making details documented in ED Course.     Details: CT abdomen pelvis pending at time of admission  Chest x-ray negative  ECG/medicine tests: ordered and independent interpretation performed. Decision-making details documented in ED Course.     Details: Normal sinus rhythm rate 94, nonspecific T wave abnormalities             Disposition  Final diagnoses:   MAXI (acute kidney injury) (HCC)   Hyponatremia     Time reflects when diagnosis was documented in both MDM as applicable and the Disposition within this note       Time User Action Codes Description Comment    2/12/2024  3:55 PM Adan Funes Add [N17.9] MAXI (acute kidney injury) (HCC)     2/12/2024  3:55 PM Adan Funes Add [E87.1] Hyponatremia           ED Disposition       ED Disposition   Admit    Condition   Stable    Date/Time   Mon Feb 12, 2024 5237    Comment   --             Follow-up Information    None         Patient's Medications   Discharge Prescriptions    No medications on file       No discharge procedures on file.    PDMP Review       None            ED Provider  Electronically Signed by             Adan Funes MD  02/12/24 5747

## 2024-02-12 NOTE — ASSESSMENT & PLAN NOTE
Creatine on admission 2.16, Baseline creatinine normal (0.8)  Etiology poor oral intake secondary to severe reflux and nausea  Imaging CT abdomen pelvis unrevealing for acute pathology, moderate hiatal hernia    Lab Results   Component Value Date    CREATININE 2.16 (H) 02/12/2024    CREATININE 0.80 09/14/2021     Hold spironolactone  Avoid hypotension, nephrotoxins, and NSAIDS if possible    Urine studies orders - calculate FeNA   IV fluids   Strict I & Os

## 2024-02-12 NOTE — ASSESSMENT & PLAN NOTE
Presents to ED with poor oral intake   Sodium on admission 129 (corrected for hyperglycemia is 130)  Hypovolemic on initial exam   Potential etiology poor oral intake   Urine sodium, urine creatinine, urine osmo, and serum osmo ordered  TSH ordered     IVF gentle  BMP BID    Lab Results   Component Value Date    SODIUM 129 (L) 02/12/2024    SODIUM 143 09/14/2021

## 2024-02-12 NOTE — ASSESSMENT & PLAN NOTE
Patient has longstanding history of GERD with moderate-sized hiatal hernia  She takes Protonix 40 mg twice daily  Has seen otolaryngology in the past for chronic cough and was recommended to see GI, has not followed up  Last EGD around 20 to 30 years ago    Consult GI  Patient with constant gagging with food and regurgitation  N.p.o. for possible EGD tomorrow

## 2024-02-13 ENCOUNTER — ANESTHESIA EVENT (INPATIENT)
Dept: GASTROENTEROLOGY | Facility: HOSPITAL | Age: 64
DRG: 683 | End: 2024-02-13
Payer: MEDICARE

## 2024-02-13 ENCOUNTER — APPOINTMENT (INPATIENT)
Dept: GASTROENTEROLOGY | Facility: HOSPITAL | Age: 64
DRG: 683 | End: 2024-02-13
Attending: HOSPITALIST
Payer: MEDICARE

## 2024-02-13 ENCOUNTER — ANESTHESIA (INPATIENT)
Dept: GASTROENTEROLOGY | Facility: HOSPITAL | Age: 64
DRG: 683 | End: 2024-02-13
Payer: MEDICARE

## 2024-02-13 PROBLEM — N39.0 UTI (URINARY TRACT INFECTION): Status: ACTIVE | Noted: 2024-02-13

## 2024-02-13 PROBLEM — R78.81 BACTEREMIA: Status: ACTIVE | Noted: 2024-02-13

## 2024-02-13 LAB
ALBUMIN SERPL BCP-MCNC: 2.9 G/DL (ref 3.5–5)
ALP SERPL-CCNC: 89 U/L (ref 34–104)
ALT SERPL W P-5'-P-CCNC: 35 U/L (ref 7–52)
ANION GAP SERPL CALCULATED.3IONS-SCNC: 7 MMOL/L
ANION GAP SERPL CALCULATED.3IONS-SCNC: 9 MMOL/L
AST SERPL W P-5'-P-CCNC: 74 U/L (ref 13–39)
ATRIAL RATE: 84 BPM
ATRIAL RATE: 94 BPM
BACTERIA UR QL AUTO: ABNORMAL /HPF
BILIRUB SERPL-MCNC: 1.73 MG/DL (ref 0.2–1)
BILIRUB UR QL STRIP: ABNORMAL
BUN SERPL-MCNC: 26 MG/DL (ref 5–25)
BUN SERPL-MCNC: 27 MG/DL (ref 5–25)
CALCIUM ALBUM COR SERPL-MCNC: 9.6 MG/DL (ref 8.3–10.1)
CALCIUM SERPL-MCNC: 8.3 MG/DL (ref 8.4–10.2)
CALCIUM SERPL-MCNC: 8.7 MG/DL (ref 8.4–10.2)
CHLORIDE SERPL-SCNC: 97 MMOL/L (ref 96–108)
CHLORIDE SERPL-SCNC: 98 MMOL/L (ref 96–108)
CLARITY UR: ABNORMAL
CO2 SERPL-SCNC: 25 MMOL/L (ref 21–32)
CO2 SERPL-SCNC: 26 MMOL/L (ref 21–32)
COLOR UR: ABNORMAL
CREAT SERPL-MCNC: 1.65 MG/DL (ref 0.6–1.3)
CREAT SERPL-MCNC: 1.7 MG/DL (ref 0.6–1.3)
CREAT UR-MCNC: 116.3 MG/DL
ERYTHROCYTE [DISTWIDTH] IN BLOOD BY AUTOMATED COUNT: 13.4 % (ref 11.6–15.1)
GFR SERPL CREATININE-BSD FRML MDRD: 31 ML/MIN/1.73SQ M
GFR SERPL CREATININE-BSD FRML MDRD: 33 ML/MIN/1.73SQ M
GLUCOSE SERPL-MCNC: 126 MG/DL (ref 65–140)
GLUCOSE SERPL-MCNC: 160 MG/DL (ref 65–140)
GLUCOSE UR STRIP-MCNC: NEGATIVE MG/DL
HCT VFR BLD AUTO: 28.4 % (ref 34.8–46.1)
HGB BLD-MCNC: 9.9 G/DL (ref 11.5–15.4)
HGB UR QL STRIP.AUTO: ABNORMAL
KETONES UR STRIP-MCNC: NEGATIVE MG/DL
LEUKOCYTE ESTERASE UR QL STRIP: ABNORMAL
MAGNESIUM SERPL-MCNC: 2.2 MG/DL (ref 1.9–2.7)
MCH RBC QN AUTO: 39.1 PG (ref 26.8–34.3)
MCHC RBC AUTO-ENTMCNC: 34.9 G/DL (ref 31.4–37.4)
MCV RBC AUTO: 112 FL (ref 82–98)
NITRITE UR QL STRIP: NEGATIVE
NON-SQ EPI CELLS URNS QL MICRO: ABNORMAL /HPF
OSMOLALITY UR: 420 MMOL/KG
P AXIS: 56 DEGREES
P AXIS: 70 DEGREES
PH UR STRIP.AUTO: 7 [PH]
PLATELET # BLD AUTO: 141 THOUSANDS/UL (ref 149–390)
PMV BLD AUTO: 10.1 FL (ref 8.9–12.7)
POTASSIUM SERPL-SCNC: 3.1 MMOL/L (ref 3.5–5.3)
POTASSIUM SERPL-SCNC: 3.4 MMOL/L (ref 3.5–5.3)
PR INTERVAL: 162 MS
PR INTERVAL: 176 MS
PROT SERPL-MCNC: 5.9 G/DL (ref 6.4–8.4)
PROT UR STRIP-MCNC: >=300 MG/DL
QRS AXIS: 20 DEGREES
QRS AXIS: 40 DEGREES
QRSD INTERVAL: 74 MS
QRSD INTERVAL: 78 MS
QT INTERVAL: 372 MS
QT INTERVAL: 394 MS
QTC INTERVAL: 439 MS
QTC INTERVAL: 492 MS
RBC # BLD AUTO: 2.53 MILLION/UL (ref 3.81–5.12)
RBC #/AREA URNS AUTO: ABNORMAL /HPF
SODIUM 24H UR-SCNC: 62 MOL/L
SODIUM SERPL-SCNC: 131 MMOL/L (ref 135–147)
SODIUM SERPL-SCNC: 131 MMOL/L (ref 135–147)
SP GR UR STRIP.AUTO: 1.02 (ref 1–1.03)
T WAVE AXIS: 229 DEGREES
T WAVE AXIS: 65 DEGREES
UROBILINOGEN UR QL STRIP.AUTO: 2 E.U./DL
VENTRICULAR RATE: 84 BPM
VENTRICULAR RATE: 94 BPM
WBC # BLD AUTO: 3.52 THOUSAND/UL (ref 4.31–10.16)
WBC #/AREA URNS AUTO: ABNORMAL /HPF

## 2024-02-13 PROCEDURE — 83935 ASSAY OF URINE OSMOLALITY: CPT

## 2024-02-13 PROCEDURE — 88341 IMHCHEM/IMCYTCHM EA ADD ANTB: CPT | Performed by: PATHOLOGY

## 2024-02-13 PROCEDURE — 83735 ASSAY OF MAGNESIUM: CPT

## 2024-02-13 PROCEDURE — 85027 COMPLETE CBC AUTOMATED: CPT

## 2024-02-13 PROCEDURE — 81001 URINALYSIS AUTO W/SCOPE: CPT

## 2024-02-13 PROCEDURE — 87186 SC STD MICRODIL/AGAR DIL: CPT

## 2024-02-13 PROCEDURE — 88342 IMHCHEM/IMCYTCHM 1ST ANTB: CPT | Performed by: PATHOLOGY

## 2024-02-13 PROCEDURE — 99232 SBSQ HOSP IP/OBS MODERATE 35: CPT

## 2024-02-13 PROCEDURE — 87086 URINE CULTURE/COLONY COUNT: CPT

## 2024-02-13 PROCEDURE — 80053 COMPREHEN METABOLIC PANEL: CPT

## 2024-02-13 PROCEDURE — 82570 ASSAY OF URINE CREATININE: CPT

## 2024-02-13 PROCEDURE — 87077 CULTURE AEROBIC IDENTIFY: CPT

## 2024-02-13 PROCEDURE — 93005 ELECTROCARDIOGRAM TRACING: CPT

## 2024-02-13 PROCEDURE — 93010 ELECTROCARDIOGRAM REPORT: CPT | Performed by: STUDENT IN AN ORGANIZED HEALTH CARE EDUCATION/TRAINING PROGRAM

## 2024-02-13 PROCEDURE — 80048 BASIC METABOLIC PNL TOTAL CA: CPT

## 2024-02-13 PROCEDURE — 97163 PT EVAL HIGH COMPLEX 45 MIN: CPT

## 2024-02-13 PROCEDURE — 97116 GAIT TRAINING THERAPY: CPT

## 2024-02-13 PROCEDURE — 84300 ASSAY OF URINE SODIUM: CPT

## 2024-02-13 PROCEDURE — 88305 TISSUE EXAM BY PATHOLOGIST: CPT | Performed by: PATHOLOGY

## 2024-02-13 RX ORDER — SUCRALFATE 1 G/1
1 TABLET ORAL
Status: DISCONTINUED | OUTPATIENT
Start: 2024-02-13 | End: 2024-02-17 | Stop reason: HOSPADM

## 2024-02-13 RX ORDER — CEFTRIAXONE 1 G/50ML
1000 INJECTION, SOLUTION INTRAVENOUS EVERY 24 HOURS
Status: DISCONTINUED | OUTPATIENT
Start: 2024-02-13 | End: 2024-02-14

## 2024-02-13 RX ORDER — PANTOPRAZOLE SODIUM 40 MG/1
40 TABLET, DELAYED RELEASE ORAL
Status: DISCONTINUED | OUTPATIENT
Start: 2024-02-13 | End: 2024-02-17 | Stop reason: HOSPADM

## 2024-02-13 RX ORDER — LIDOCAINE HYDROCHLORIDE 10 MG/ML
INJECTION, SOLUTION EPIDURAL; INFILTRATION; INTRACAUDAL; PERINEURAL AS NEEDED
Status: DISCONTINUED | OUTPATIENT
Start: 2024-02-13 | End: 2024-02-13

## 2024-02-13 RX ORDER — SODIUM CHLORIDE, SODIUM LACTATE, POTASSIUM CHLORIDE, CALCIUM CHLORIDE 600; 310; 30; 20 MG/100ML; MG/100ML; MG/100ML; MG/100ML
INJECTION, SOLUTION INTRAVENOUS CONTINUOUS PRN
Status: DISCONTINUED | OUTPATIENT
Start: 2024-02-13 | End: 2024-02-13

## 2024-02-13 RX ORDER — PROPOFOL 10 MG/ML
INJECTION, EMULSION INTRAVENOUS AS NEEDED
Status: DISCONTINUED | OUTPATIENT
Start: 2024-02-13 | End: 2024-02-13

## 2024-02-13 RX ADMIN — SUCRALFATE 1 G: 1 TABLET ORAL at 21:40

## 2024-02-13 RX ADMIN — CEFTRIAXONE 1000 MG: 1 INJECTION, SOLUTION INTRAVENOUS at 08:45

## 2024-02-13 RX ADMIN — SUCRALFATE 1 G: 1 TABLET ORAL at 16:55

## 2024-02-13 RX ADMIN — PANTOPRAZOLE SODIUM 40 MG: 40 TABLET, DELAYED RELEASE ORAL at 16:55

## 2024-02-13 RX ADMIN — HEPARIN SODIUM 5000 UNITS: 5000 INJECTION INTRAVENOUS; SUBCUTANEOUS at 14:13

## 2024-02-13 RX ADMIN — METOPROLOL SUCCINATE 50 MG: 50 TABLET, EXTENDED RELEASE ORAL at 08:44

## 2024-02-13 RX ADMIN — METOPROLOL SUCCINATE 50 MG: 50 TABLET, EXTENDED RELEASE ORAL at 21:37

## 2024-02-13 RX ADMIN — BUPROPION HYDROCHLORIDE 300 MG: 150 TABLET, EXTENDED RELEASE ORAL at 08:45

## 2024-02-13 RX ADMIN — PROPOFOL 150 MCG/KG/MIN: 10 INJECTION, EMULSION INTRAVENOUS at 16:05

## 2024-02-13 RX ADMIN — SODIUM CHLORIDE, SODIUM LACTATE, POTASSIUM CHLORIDE, AND CALCIUM CHLORIDE: .6; .31; .03; .02 INJECTION, SOLUTION INTRAVENOUS at 15:57

## 2024-02-13 RX ADMIN — SODIUM CHLORIDE, SODIUM GLUCONATE, SODIUM ACETATE, POTASSIUM CHLORIDE, MAGNESIUM CHLORIDE, SODIUM PHOSPHATE, DIBASIC, AND POTASSIUM PHOSPHATE 75 ML/HR: .53; .5; .37; .037; .03; .012; .00082 INJECTION, SOLUTION INTRAVENOUS at 07:37

## 2024-02-13 RX ADMIN — PROPOFOL 70 MG: 10 INJECTION, EMULSION INTRAVENOUS at 16:04

## 2024-02-13 RX ADMIN — HEPARIN SODIUM 5000 UNITS: 5000 INJECTION INTRAVENOUS; SUBCUTANEOUS at 04:58

## 2024-02-13 RX ADMIN — Medication 1000 UNITS: at 08:44

## 2024-02-13 RX ADMIN — LIDOCAINE HYDROCHLORIDE 50 MG: 10 INJECTION, SOLUTION EPIDURAL; INFILTRATION; INTRACAUDAL; PERINEURAL at 16:04

## 2024-02-13 RX ADMIN — HEPARIN SODIUM 5000 UNITS: 5000 INJECTION INTRAVENOUS; SUBCUTANEOUS at 21:40

## 2024-02-13 NOTE — ASSESSMENT & PLAN NOTE
Creatine on admission 2.16, Baseline creatinine normal (0.8)  Etiology poor oral intake secondary to severe reflux and nausea  Imaging CT abdomen pelvis unrevealing for acute pathology, moderate hiatal hernia    Lab Results   Component Value Date    CREATININE 1.70 (H) 02/13/2024    CREATININE 2.16 (H) 02/12/2024     Hold spironolactone  Avoid hypotension, nephrotoxins, and NSAIDS if possible    Urine studies orders - calculate FeNA - prerenal   IV fluids - improving, continue IVF  Strict I & Os  Treat underlying UTI

## 2024-02-13 NOTE — CONSULTS
Consultation - GI   Lizette Cummings 63 y.o. female MRN: [id number]  Unit/Bed#: -01 Encounter: [id number]      Assessment/Plan     1.  Persistent nausea and vomiting and inability to maintain caloric intake complicated with electrolyte derangement.    I had extensive discussion regarding the patient's symptoms and potential triggers and at this point we decided to proceed with upper endoscopy to rule out any obstructive pathologies.  Further management will be based on the finding of her procedure.    She might benefit from barium swallow while she is here in the hospital however she might need outpatient referral for high-resolution esophageal manomety.      Consent:    We have discussed the procedure in detail. We reviewed risks, benefits and alternative as well as protentional complications including and not limited to medication side effect , infection, bleeding, perforation and the potential need for surgery, ICU admission, CPR, as well as the need for blood product transfusion. Patient verbalized understanding and agreement. All patient questions were answered.     2.  Remote history of complicated diverticulitis status post colon resection  Patient is average risk for colon cancer screening and she is overdue this should be arranged as an outpatient.    History of Present Illness   Physician Requesting Consult: Lorenzo Fitzpatrick MD  Reason for Consult / Principal Problem: Dysphagia  Hx and PE limited by:   HPI: Lizette Cummings is a 63 y.o. year old female who presents with to the emergency room as she has not been able to tolerate p.o. intake for the last 3 weeks.      In detail, patient has been struggling with multiple nonspecific GI symptoms and since she was diagnosed with COVID in 2021 she has been having difficulty swallowing to solids and liquid symptoms became more pronounced over the last 3 weeks to the point that she vomits everything she eat.  This has been causing significant amount of  stress.  She usually does not see or follow-up doctors on regular basis and her last time she was seen by GI almost 20 to 30 years when she had her surgery for diverticulitis.  After COVID she saw cardiology and pulmonology and her evaluation was relatively reassuring however she still does not feel well.  She has history of anxiety and she indicated that she has been struggling with financial status and recently she was up proved for social support hence the stress level is easing out.    She used to take over-the-counter Aleve about a few months ago however she has not been taking it lately.    Upon presentation it was noted that she was hemodynamically stable.  CT scan was done and showed no acute pathologies in the abdomen.      Patient denied all the following, known history of neuromuscular disorder, strokes, autoimmune disease, using dentures, oropharyngeal weakness or surgeries, thyroid mass or lesion or surgery,  dilated cardiomyopathy, history of diabetes, using narcotics, radiation/  chemotherapy, or forgut surgery.     Review of Systems   Constitutional:  Positive for appetite change and fatigue.   HENT:  Positive for sore throat.    Gastrointestinal:  Positive for abdominal distention and nausea.   Endocrine: Negative.    Genitourinary: Negative.    Musculoskeletal: Negative.    Neurological:  Positive for light-headedness.   Hematological: Negative.        Historical Information   Past Medical History:   Diagnosis Date    Anxiety     Diverticular disease     GERD (gastroesophageal reflux disease)     Hypertension      Past Surgical History:   Procedure Laterality Date    BOWEL RESECTION      HERNIA REPAIR      KNEE ARTHROSCOPY      SHOULDER SURGERY Left      Social History   Social History     Substance and Sexual Activity   Alcohol Use Yes    Comment: social     Social History     Substance and Sexual Activity   Drug Use Never     E-Cigarette/Vaping    E-Cigarette Use Never User       E-Cigarette/Vaping Substances     Social History     Tobacco Use   Smoking Status Former    Current packs/day: 0.00    Types: Cigarettes    Quit date:     Years since quittin.1   Smokeless Tobacco Never     Family History: non-contributory    Meds/Allergies   all current active meds have been reviewed    Allergies   Allergen Reactions    Ace Inhibitors Anaphylaxis    Other Anaphylaxis     arb    Ciprofloxacin Rash    Sulfa Antibiotics Rash       Objective       Intake/Output Summary (Last 24 hours) at 2024 1519  Last data filed at 2024 0845  Gross per 24 hour   Intake 1170 ml   Output 500 ml   Net 670 ml       Invasive Devices:   Peripheral IV 24 Left Antecubital (Active)   Site Assessment WDL;Clean;Dry;Intact 24 1300   Dressing Type Transparent 24 1300   Line Status Flushed;Infusing 24 1300   Dressing Status Clean;Dry;Intact 24 1300   Dressing Change Due 24 1300       External Urinary Catheter (Active)       Physical Exam  Eyes:      Conjunctiva/sclera: Conjunctivae normal.   Cardiovascular:      Rate and Rhythm: Normal rate.   Pulmonary:      Effort: Pulmonary effort is normal.   Abdominal:      Palpations: Abdomen is soft.   Neurological:      General: No focal deficit present.      Mental Status: She is alert.   Psychiatric:         Mood and Affect: Mood normal.         Lab Results: I have personally reviewed pertinent reports.    Imaging Studies: I have personally reviewed pertinent reports.    EKG, Pathology, and Other Studies: I have personally reviewed pertinent reports.        Counseling / Coordination of Care  Total floor / unit time spent today 30 minutes. Greater than 50% of total time was spent with the patient and / or family counseling and / or coordination of care. A description of the counseling / coordination of care:

## 2024-02-13 NOTE — PROGRESS NOTES
[facility]  Progress Note  Name: Lizette Cummings I  MRN: [id number]  Unit/Bed#: -01 I Date of Admission: 2/12/2024   Date of Service: 2/13/2024 I Hospital Day: 1    Assessment/Plan   * MAXI (acute kidney injury) (HCC)  Assessment & Plan  Creatine on admission 2.16, Baseline creatinine normal (0.8)  Etiology poor oral intake secondary to severe reflux and nausea  Imaging CT abdomen pelvis unrevealing for acute pathology, moderate hiatal hernia    Lab Results   Component Value Date    CREATININE 1.70 (H) 02/13/2024    CREATININE 2.16 (H) 02/12/2024     Hold spironolactone  Avoid hypotension, nephrotoxins, and NSAIDS if possible    Urine studies orders - calculate FeNA - prerenal   IV fluids - improving, continue IVF  Strict I & Os  Treat underlying UTI        UTI (urinary tract infection)  Assessment & Plan  Patient without symptoms of UTI however positive UA and reflex to culture with Positive BC   Start ceftriaxone   Trend WBC    Bacteremia  Assessment & Plan  Patient with 1/2 BC positive for proteus species - other BC not resulted yet   Continue ceftriaxone  Also with positive UA for UTI as possible source   Infectious disease consult placed  Trend WBC    Electrolyte disturbance  Assessment & Plan  Both K and Mg low on admission   Patient declines IV K, will try oral solution   Continue to trend - improving     Lab Results   Component Value Date    K 3.4 (L) 02/13/2024    K 3.2 (L) 02/12/2024    MG 2.2 02/13/2024    MG 1.7 (L) 02/12/2024         Insomnia  Assessment & Plan  Patient reports insomnia   Usually takes klonopin - PDMP reviewed. Prescribed by PCP reports not taking every night   Will order PRN     GERD (gastroesophageal reflux disease)  Assessment & Plan  Patient has longstanding history of GERD with moderate-sized hiatal hernia  She takes Protonix 40 mg twice daily  Has seen otolaryngology in the past for chronic cough and was recommended to see GI, has not followed  up  Last EGD around 20 to 30 years ago    Consult GI  Patient with constant gagging with food and regurgitation  N.p.o. while pending eval     Generalized weakness  Assessment & Plan  Patient endorses some generalized weakness and fatigue likely secondary to poor oral intake  PT/OT consults    Hypertension  Assessment & Plan  Continue metoprolol (decreased dose to avoid hypotension) hold spironolactone     Hyponatremia  Assessment & Plan  Presents to ED with poor oral intake   Sodium on admission 129 (corrected for hyperglycemia is 130)  Hypovolemic on initial exam   Potential etiology poor oral intake   Urine sodium, urine creatinine, urine osmo, and serum osmo ordered  Pending full studies   TSH ordered - wnl     IVF gentle  BMP BID    Lab Results   Component Value Date    SODIUM 131 (L) 02/13/2024    SODIUM 129 (L) 02/12/2024         Lab Results   Component Value Date    SODIUM 131 (L) 02/13/2024    SODIUM 129 (L) 02/12/2024    SODIUM 143 09/14/2021                    VTE Pharmacologic Prophylaxis:   Moderate Risk (Score 3-4) - Pharmacological DVT Prophylaxis Ordered: heparin.    Mobility:   Basic Mobility Inpatient Raw Score: 20  JH-HLM Goal: 6: Walk 10 steps or more  JH-HLM Achieved: 6: Walk 10 steps or more  HLM Goal achieved. Continue to encourage appropriate mobility.    Patient Centered Rounds: I performed bedside rounds with nursing staff today.   Discussions with Specialists or Other Care Team Provider: CM     Education and Discussions with Family / Patient:  Will update  post GI eval with plan .     Total Time Spent on Date of Encounter in care of patient:  mins. This time was spent on one or more of the following: performing physical exam; counseling and coordination of care; obtaining or reviewing history; documenting in the medical record; reviewing/ordering tests, medications or procedures; communicating with other healthcare professionals and discussing with patient's  family/caregivers.    Current Length of Stay: 1 day(s)  Current Patient Status: Inpatient   Certification Statement: The patient will continue to require additional inpatient hospital stay due to UTI, bacteremia, GI eval   Discharge Plan: Anticipate discharge in >72 hrs to discharge location to be determined pending rehab evaluations.    Code Status: Level 1 - Full Code    Subjective:   Seen and examined. Feeling tired and denies burning with urination     Objective:     Vitals:   Temp (24hrs), Av °F (36.7 °C), Min:97.7 °F (36.5 °C), Max:98.4 °F (36.9 °C)    Temp:  [97.7 °F (36.5 °C)-98.4 °F (36.9 °C)] 98.1 °F (36.7 °C)  HR:  [] 109  Resp:  [18] 18  BP: ()/(57-75) 94/57  SpO2:  [93 %-99 %] 94 %  Body mass index is 22.71 kg/m².     Input and Output Summary (last 24 hours):     Intake/Output Summary (Last 24 hours) at 2024 1351  Last data filed at 2024 0845  Gross per 24 hour   Intake 1170 ml   Output 500 ml   Net 670 ml       Physical Exam:   Physical Exam  Vitals and nursing note reviewed.   Constitutional:       General: She is not in acute distress.     Appearance: Normal appearance.   HENT:      Head: Normocephalic and atraumatic.      Nose: No congestion.      Mouth/Throat:      Mouth: Mucous membranes are moist.   Eyes:      Conjunctiva/sclera: Conjunctivae normal.   Cardiovascular:      Rate and Rhythm: Normal rate and regular rhythm.      Pulses: Normal pulses.      Heart sounds: Normal heart sounds. No murmur heard.  Pulmonary:      Effort: Pulmonary effort is normal. No respiratory distress.      Breath sounds: Normal breath sounds.   Abdominal:      General: Bowel sounds are normal.      Palpations: Abdomen is soft.      Tenderness: There is no abdominal tenderness.   Musculoskeletal:         General: Normal range of motion.      Right lower leg: No edema.      Left lower leg: No edema.   Skin:     General: Skin is warm and dry.   Neurological:      Mental Status: She is alert and  oriented to person, place, and time.          Additional Data:     Labs:  Results from last 7 days   Lab Units 02/13/24  0436 02/12/24  1438   WBC Thousand/uL 3.52* 6.96   HEMOGLOBIN g/dL 9.9* 11.8   HEMATOCRIT % 28.4* 33.7*   PLATELETS Thousands/uL 141* 172   NEUTROS PCT %  --  71   LYMPHS PCT %  --  10*   MONOS PCT %  --  19*   EOS PCT %  --  0     Results from last 7 days   Lab Units 02/13/24  0436   SODIUM mmol/L 131*   POTASSIUM mmol/L 3.4*   CHLORIDE mmol/L 97   CO2 mmol/L 25   BUN mg/dL 27*   CREATININE mg/dL 1.70*   ANION GAP mmol/L 9   CALCIUM mg/dL 8.7   ALBUMIN g/dL 2.9*   TOTAL BILIRUBIN mg/dL 1.73*   ALK PHOS U/L 89   ALT U/L 35   AST U/L 74*   GLUCOSE RANDOM mg/dL 126                 Results from last 7 days   Lab Units 02/12/24  1438   LACTIC ACID mmol/L 1.1       Lines/Drains:  Invasive Devices       Peripheral Intravenous Line  Duration             Peripheral IV 02/12/24 Left Antecubital <1 day              Drain  Duration             External Urinary Catheter <1 day                          Imaging: No pertinent imaging reviewed.    Recent Cultures (last 7 days):   Results from last 7 days   Lab Units 02/12/24  1438   BLOOD CULTURE  Received in Microbiology Lab. Culture in Progress.   GRAM STAIN RESULT  Gram negative rods*       Last 24 Hours Medication List:   Current Facility-Administered Medications   Medication Dose Route Frequency Provider Last Rate    acetaminophen  650 mg Oral Q6H PRN Ana Maria Morillo PA-C      albuterol  2.5 mg Nebulization Q6H PRN Ana Maria Morillo PA-C      aluminum-magnesium hydroxide-simethicone  30 mL Oral Q6H PRN Ana Maria Morillo PA-C      buPROPion  300 mg Oral Daily Ana Maria Morillo PA-C      cefTRIAXone  1,000 mg Intravenous Q24H Ana Maria Morillo PA-C 1,000 mg (02/13/24 0845)    cholecalciferol  1,000 Units Oral Daily Ana Maria Morillo PA-C      clonazePAM  0.5 mg Oral HS PRN Ana Maria Morillo PA-C      heparin (porcine)  5,000 Units Subcutaneous Q8H [facility] Ana Mraia Morillo PA-C       metoprolol succinate  50 mg Oral BID Ana Maria Morillo PA-C      multi-electrolyte  75 mL/hr Intravenous Continuous Ana Maria Morillo PA-C 75 mL/hr (02/13/24 0737)        Today, Patient Was Seen By: Ana Maria Morillo PA-C    **Please Note: This note may have been constructed using a voice recognition system.**

## 2024-02-13 NOTE — ANESTHESIA POSTPROCEDURE EVALUATION
Post-Op Assessment Note    CV Status:  Stable  Pain Score: 0    Pain management: adequate       Mental Status:  Alert and awake   Hydration Status:  Stable   PONV Controlled:  Controlled   Airway Patency:  Patent     Post Op Vitals Reviewed: Yes    No anethesia notable event occurred.    Staff: CRNA               /64 (02/13/24 1618)    Temp 99.6 °F (37.6 °C) (02/13/24 1618)    Pulse 85 (02/13/24 1618)   Resp 18 (02/13/24 1618)    SpO2 98 % (02/13/24 1618)

## 2024-02-13 NOTE — ASSESSMENT & PLAN NOTE
Both K and Mg low on admission   Patient declines IV K, will try oral solution   Continue to trend - improving     Lab Results   Component Value Date    K 3.4 (L) 02/13/2024    K 3.2 (L) 02/12/2024    MG 2.2 02/13/2024    MG 1.7 (L) 02/12/2024

## 2024-02-13 NOTE — ASSESSMENT & PLAN NOTE
Patient with 1/2 BC positive for proteus species - other BC not resulted yet   Continue ceftriaxone  Also with positive UA for UTI as possible source   Infectious disease consult placed  Trend WBC

## 2024-02-13 NOTE — ANESTHESIA PREPROCEDURE EVALUATION
Procedure:  EGD    Relevant Problems   CARDIO   (+) Hypertension      GI/HEPATIC   (+) GERD (gastroesophageal reflux disease)      /RENAL   (+) MAXI (acute kidney injury) (HCC)        Physical Exam    Airway    Mallampati score: II  TM Distance: >3 FB  Neck ROM: full     Dental   No notable dental hx     Cardiovascular  Rhythm: regular, Rate: normal, Cardiovascular exam normal    Pulmonary  Pulmonary exam normal Breath sounds clear to auscultation    Other Findings  post-pubertal.      Anesthesia Plan  ASA Score- 2     Anesthesia Type- IV sedation with anesthesia with ASA Monitors.         Additional Monitors:     Airway Plan: NTT.           Plan Factors-Exercise tolerance (METS): >4 METS.    Chart reviewed. EKG reviewed. Imaging results reviewed. Existing labs reviewed. Patient summary reviewed.    Patient is not a current smoker.  Patient did not smoke on day of surgery.    Obstructive sleep apnea risk education given perioperatively.        Induction- intravenous.    Postoperative Plan-     Informed Consent- Anesthetic plan and risks discussed with patient.  I personally reviewed this patient with the CRNA. Discussed and agreed on the Anesthesia Plan with the CRNA..

## 2024-02-13 NOTE — SPEECH THERAPY NOTE
Speech Language/Pathology  Attempted to see patient for dysphagia evaluation as ordered. Pt is NPO pending GI. SLP will f/u as able/appropriate    Hawa De Dios MS CCC-SLP  2/13/2024

## 2024-02-13 NOTE — ASSESSMENT & PLAN NOTE
Patient has longstanding history of GERD with moderate-sized hiatal hernia  She takes Protonix 40 mg twice daily  Has seen otolaryngology in the past for chronic cough and was recommended to see GI, has not followed up  Last EGD around 20 to 30 years ago    Consult GI  Patient with constant gagging with food and regurgitation  N.p.o. while pending eval

## 2024-02-13 NOTE — ASSESSMENT & PLAN NOTE
Presents to ED with poor oral intake   Sodium on admission 129 (corrected for hyperglycemia is 130)  Hypovolemic on initial exam   Potential etiology poor oral intake   Urine sodium, urine creatinine, urine osmo, and serum osmo ordered  Pending full studies   TSH ordered - wnl     IVF gentle  BMP BID    Lab Results   Component Value Date    SODIUM 131 (L) 02/13/2024    SODIUM 129 (L) 02/12/2024         Lab Results   Component Value Date    SODIUM 131 (L) 02/13/2024    SODIUM 129 (L) 02/12/2024    SODIUM 143 09/14/2021

## 2024-02-13 NOTE — PLAN OF CARE
"  Problem: PHYSICAL THERAPY ADULT  Goal: Performs mobility at highest level of function for planned discharge setting.  See evaluation for individualized goals.  Description: Treatment/Interventions: Functional transfer training, ADL retraining, LE strengthening/ROM, Elevations, Therapeutic exercise, Endurance training, Patient/family training, Equipment eval/education, Bed mobility, Compensatory technique education, Gait training, Spoke to nursing  Equipment Recommended: Walker       See flowsheet documentation for full assessment, interventions and recommendations.  2/13/2024 1426 by Justus Beavers PT  Outcome: Progressing  Note: Prognosis: Good  Problem List: Decreased strength, Decreased endurance, Impaired balance, Decreased mobility  Pt tolerates tx session fairly.  Pt reports having \"generalized fatigue\" that is limiting.  Pt also with intermittent c/o feeling lightheaded which she describes as experiencing since having her covid diagnosis.  Pt unwilling to attempt > 2 stairs at this time.  Barriers to Discharge: Other (Comment)  Barriers to Discharge Comments: fall risk, generalized fatigue -- does have very good support from spouse  Rehab Resource Intensity Level, PT: II (Moderate Resource Intensity)    See flowsheet documentation for full assessment.        "

## 2024-02-13 NOTE — PLAN OF CARE
Problem: PHYSICAL THERAPY ADULT  Goal: Performs mobility at highest level of function for planned discharge setting.  See evaluation for individualized goals.  Description: Treatment/Interventions: Functional transfer training, ADL retraining, LE strengthening/ROM, Elevations, Therapeutic exercise, Endurance training, Patient/family training, Equipment eval/education, Bed mobility, Compensatory technique education, Gait training, Spoke to nursing  Equipment Recommended: Walker       See flowsheet documentation for full assessment, interventions and recommendations.  Note: Prognosis: Good  Problem List: Decreased strength, Decreased endurance, Impaired balance, Decreased mobility  Assessment: Pt is a 63 y.o. female seen for PT evaluation s/p admission to [facility] on 2/12/2024 with MAXI (acute kidney injury) (HCC).  Order placed for PT services.  Upon evaluation: Pt is presenting with impaired functional mobility due to decreased strength, decreased endurance, impaired balance, gait deviations, and fall risk requiring  SPV assistance for bed mobility and stand-by to steadying assistance for transfers and ambulation with RW . Pt's clinical presentation is currently unstable/unpredictable given the functional mobility deficits above, especially decreased activity tolerance and decreased functional mobility tolerance, coupled with fall risks as indicated by AM-PAC 6-Clicks: 18/24 as well as hx of falls and combined with medical complications of hypotension, abnormal renal lab values, abnormal H&H, abnormal WBCs, abnormal CBC, abnormal sodium values, abnormal potassium values, and need for input for mobility technique/safety.  Pt's PMHx and comorbidities that may affect physical performance and progress include: anxiety and HTN. Personal factors affecting pt at time of IE include: anxiety, step(s) to enter environment, and recent fall(s)/fall history. Pt will benefit from continued skilled PT  services to address deficits as defined above and to maximize level of functional mobility to facilitate return toward PLOF and improved QOL. From PT/mobility standpoint, recommendation at time of d/c would be Level II (Moderate Resource Intensity and with walker pending progress in order to reduce fall risk and maximize pt's functional independence and consistency with mobility in order to facilitate return to PLOF.  Recommend trial with walker next 1-2 sessions, ther ex next 1-2 sessions, and stair navigation .  Barriers to Discharge: Other (Comment)  Barriers to Discharge Comments: fall risk, generalized fatigue -- does have very good support from spouse  Rehab Resource Intensity Level, PT: II (Moderate Resource Intensity)    See flowsheet documentation for full assessment.

## 2024-02-13 NOTE — PLAN OF CARE
Problem: Potential for Falls  Goal: Patient will remain free of falls  Description: INTERVENTIONS:  - Educate patient/family on patient safety including physical limitations  - Instruct patient to call for assistance with activity   - Consult OT/PT to assist with strengthening/mobility   - Keep Call bell within reach  - Keep bed low and locked with side rails adjusted as appropriate  - Keep care items and personal belongings within reach  - Initiate and maintain comfort rounds  - Make Fall Risk Sign visible to staff  - Apply yellow socks and bracelet for high fall risk patients  - Consider moving patient to room near nurses station  Outcome: Progressing     Problem: Nutrition/Hydration-ADULT  Goal: Nutrient/Hydration intake appropriate for improving, restoring or maintaining nutritional needs  Description: Monitor and assess patient's nutrition/hydration status for malnutrition. Collaborate with interdisciplinary team and initiate plan and interventions as ordered.  Monitor patient's weight and dietary intake as ordered or per policy. Utilize nutrition screening tool and intervene as necessary. Determine patient's food preferences and provide high-protein, high-caloric foods as appropriate.     INTERVENTIONS:  - Monitor oral intake, urinary output, labs, and treatment plans  - Assess nutrition and hydration status and recommend course of action  - Evaluate amount of meals eaten  - Assist patient with eating if necessary   - Allow adequate time for meals  - Recommend/ encourage appropriate diets, oral nutritional supplements, and vitamin/mineral supplements  - Order, calculate, and assess calorie counts as needed  - Recommend, monitor, and adjust tube feedings and TPN/PPN based on assessed needs  - Assess need for intravenous fluids  - Provide specific nutrition/hydration education as appropriate  - Include patient/family/caregiver in decisions related to nutrition  Outcome: Progressing     Problem:  GASTROINTESTINAL - ADULT  Goal: Minimal or absence of nausea and/or vomiting  Description: INTERVENTIONS:  - Administer IV fluids if ordered to ensure adequate hydration  - Maintain NPO status until nausea and vomiting are resolved  - Nasogastric tube if ordered  - Administer ordered antiemetic medications as needed  - Provide nonpharmacologic comfort measures as appropriate  - Advance diet as tolerated, if ordered  - Consider nutrition services referral to assist patient with adequate nutrition and appropriate food choices  Outcome: Progressing  Goal: Maintains or returns to baseline bowel function  Description: INTERVENTIONS:  - Assess bowel function  - Encourage oral fluids to ensure adequate hydration  - Administer IV fluids if ordered to ensure adequate hydration  - Administer ordered medications as needed  - Encourage mobilization and activity  - Consider nutritional services referral to assist patient with adequate nutrition and appropriate food choices  Outcome: Progressing  Goal: Maintains adequate nutritional intake  Description: INTERVENTIONS:  - Monitor percentage of each meal consumed  - Identify factors contributing to decreased intake, treat as appropriate  - Assist with meals as needed  - Monitor I&O, weight, and lab values if indicated  - Obtain nutrition services referral as needed  Outcome: Progressing  Goal: Oral mucous membranes remain intact  Description: INTERVENTIONS  - Assess oral mucosa and hygiene practices  - Implement preventative oral hygiene regimen  - Implement oral medicated treatments as ordered  - Initiate Nutrition services referral as needed  Outcome: Progressing

## 2024-02-13 NOTE — PLAN OF CARE
Problem: Potential for Falls  Goal: Patient will remain free of falls  Description: INTERVENTIONS:  - Educate patient/family on patient safety including physical limitations  - Instruct patient to call for assistance with activity   - Consult OT/PT to assist with strengthening/mobility   - Keep Call bell within reach  - Keep bed low and locked with side rails adjusted as appropriate  - Keep care items and personal belongings within reach  - Initiate and maintain comfort rounds  - Make Fall Risk Sign visible to staff  - Offer Toileting every 2 Hours, in advance of need  - Initiate/Maintain alarm  - Obtain necessary fall risk management equipment: socks  - Apply yellow socks and bracelet for high fall risk patients  - Consider moving patient to room near nurses station  Outcome: Progressing

## 2024-02-13 NOTE — PHYSICAL THERAPY NOTE
PHYSICAL THERAPY EVALUATION    NAME:  Lizette Cummings  DATE: 02/13/24    AGE:   63 y.o.  Mrn:   [id number]  ADMIT DX:  Hyponatremia [E87.1]  Weakness generalized [R53.1]  Gastroesophageal reflux disease without esophagitis [K21.9]  MAXI (acute kidney injury) (HCC) [N17.9]    Past Medical History:   Diagnosis Date    Anxiety     Diverticular disease     GERD (gastroesophageal reflux disease)     Hypertension      Length Of Stay: 1  Performed at least 2 patient identifiers during session: Name and Birthday  PHYSICAL THERAPY EVALUATION:       02/13/24 1309   PT Last Visit   PT Visit Date 02/13/24   Note Type   Note type Evaluation   Pain Assessment   Pain Assessment Tool 0-10   Pain Score   (discomfort in abdomen during coughing spells)   Restrictions/Precautions   Weight Bearing Precautions Per Order No   Other Precautions Chair Alarm;Bed Alarm;Fall Risk;Multiple lines   Home Living   Type of Home House   Home Layout One level;Performs ADLs on one level;Able to live on main level with bedroom/bathroom  (4-5 deck steps + 1 thruogh doorway B rails;)   Bathroom Shower/Tub Walk-in shower   Bathroom Toilet Standard   Bathroom Equipment Grab bars in shower;Built-in shower seat;Grab bars around toilet   Home Equipment Cane  (rollator)   Prior Function   Level of [place] Needs assistance with ADLs;Needs assistance with IADLS;Independent with functional mobility  (spouse assists with showers; spouse intermittently assists with dressing depending upon attire; spouse also provides light assist for CORINE home)   Lives With Spouse  (and 11 yr old granddtr)   Receives Help From Family   IADLs Family/Friend/Other provides meals;Family/Friend/Other provides transportation;Family/Friend/Other provides medication management   Falls in the last 6 months 1 to 4  (2)   Comments Mod (I) functional mobility within the home most of the time, no AD.  Intermittent assist to get off of toilet at home. Does not go out often.   General  "  Additional Pertinent History reports generalized fatigue since having Covid in the past   Family/Caregiver Present Yes   Cognition   Overall Cognitive Status Impaired   Arousal/Participation Cooperative   Orientation Level Oriented X4   Memory Decreased short term memory   Following Commands Follows one step commands with increased time or repetition   RLE Assessment   RLE Assessment WFL  (4-/5)   LLE Assessment   LLE Assessment X   Strength LLE   L Hip Flexion 3/5   L Knee Extension 3/5   L Ankle Dorsiflexion 3+/5   Vision-Basic Assessment   Current Vision Wears glasses all the time   Visual History   (reports her vision has been \"off\" since having Covid)   Light Touch   RLE Light Touch Grossly intact   LLE Light Touch Grossly intact   Proprioception   RLE Proprioception Grossly intact   LLE Proprioception Grossly Intact   Bed Mobility   Supine to Sit 5  Supervision   Transfers   Sit to Stand   (SBA)   Additional items Verbal cues;Increased time required   Stand to Sit   (SBA)   Additional items Increased time required;Verbal cues   Stand pivot   (steadying assist)   Additional items Increased time required;Verbal cues  (cues needed to continue using RW for \"full\" pivot)   Additional Comments RW. VCs needed for safety/technique.   Ambulation/Elevation   Gait pattern Improper Weight shift;Excessively slow;Decreased hip extension   Gait Assistance   (SBA - steadying assist)   Additional items Verbal cues;Assist x 1   Assistive Device Rolling walker   Distance 25 ft   Balance   Static Sitting Good   Dynamic Sitting Fair +   Static Standing Fair   Dynamic Standing Fair -   Ambulatory Fair -  (RW)   Endurance Deficit   Endurance Deficit Yes   Endurance Deficit Description reports mild SOB however SpO2 and HR WNL   Activity Tolerance   Activity Tolerance Patient limited by fatigue   Nurse Made Aware RN Rosa   Assessment   Prognosis Good   Problem List Decreased strength;Decreased endurance;Impaired balance;Decreased " "mobility   Barriers to Discharge Other (Comment)   Barriers to Discharge Comments fall risk, generalized fatigue -- does have very good support from spouse   Goals   Patient Goals to go home   STG Expiration Date 02/27/24   PT Treatment Day 1   Plan   Treatment/Interventions Functional transfer training;ADL retraining;LE strengthening/ROM;Elevations;Therapeutic exercise;Endurance training;Patient/family training;Equipment eval/education;Bed mobility;Compensatory technique education;Gait training;Spoke to nursing   PT Frequency 3-5x/wk   Discharge Recommendation   Rehab Resource Intensity Level, PT II (Moderate Resource Intensity)   Equipment Recommended Walker   Walker Package Recommended Wheeled walker   Additional Comments Bedside Commode   AM-PAC Basic Mobility Inpatient   Turning in Flat Bed Without Bedrails 3   Lying on Back to Sitting on Edge of Flat Bed Without Bedrails 3   Moving Bed to Chair 3   Standing Up From Chair Using Arms 3   Walk in Room 3   Climb 3-5 Stairs With Railing 3   Basic Mobility Inpatient Raw Score 18   Basic Mobility Standardized Score 41.05   Highest Level Of Mobility   JH-HLM Goal 6: Walk 10 steps or more   JH-HLM Achieved 7: Walk 25 feet or more   Additional Treatment Session   Start Time 1346   End Time 1402   Treatment Assessment Pt tolerates tx session fairly.  Pt reports having \"generalized fatigue\" that is limiting.  Pt also with intermittent c/o feeling lightheaded which she describes as experiencing since having her covid diagnosis.  Pt unwilling to attempt > 2 stairs at this time.   Equipment Use Pt ambulates 10 ft x 2 using RW and SBA.  Pt navigates 2 steps B rails with SBA.  Pt completes \"simulated\" car transfer with min A x 1 using RW and VCs.   End of Consult   Patient Position at End of Consult Supine;Bed/Chair alarm activated;All needs within reach   End of Consult Comments pt declines remaining OOB in recliner chair         02/13/24 1333 02/13/24 1335 02/13/24 1336 "   Vitals   Pulse 99 (!) 107 (!) 109   Blood Pressure 111/64 92/57 94/57   MAP (mmHg) 80 69 69   Patient Position - Orthostatic VS Sitting - Orthostatic VS Standing - Orthostatic VS Standing for 3 minutes - Orthostatic VS       (Please find full objective findings from PT assessment regarding body systems outlined above).     Assessment: Pt is a 63 y.o. female seen for PT evaluation s/p admission to [facility] on 2/12/2024 with MAXI (acute kidney injury) (HCC).  Order placed for PT services.  Upon evaluation: Pt is presenting with impaired functional mobility due to decreased strength, decreased endurance, impaired balance, gait deviations, and fall risk requiring  SPV assistance for bed mobility and stand-by to steadying assistance for transfers and ambulation with RW . Pt's clinical presentation is currently unstable/unpredictable given the functional mobility deficits above, especially decreased activity tolerance and decreased functional mobility tolerance, coupled with fall risks as indicated by AM-PAC 6-Clicks: 18/24 as well as hx of falls and combined with medical complications of hypotension, abnormal renal lab values, abnormal H&H, abnormal WBCs, abnormal CBC, abnormal sodium values, abnormal potassium values, and need for input for mobility technique/safety.  Pt's PMHx and comorbidities that may affect physical performance and progress include: anxiety and HTN. Personal factors affecting pt at time of IE include: anxiety, step(s) to enter environment, and recent fall(s)/fall history. Pt will benefit from continued skilled PT services to address deficits as defined above and to maximize level of functional mobility to facilitate return toward PLOF and improved QOL. From PT/mobility standpoint, recommendation at time of d/c would be Level II (Moderate Resource Intensity and with walker pending progress in order to reduce fall risk and maximize pt's functional independence and consistency  with mobility in order to facilitate return to PLOF.  Recommend trial with walker next 1-2 sessions, ther ex next 1-2 sessions, and stair navigation .     The patient's AM-PAC Basic Mobility Inpatient Short Form Raw Score is 18. A Raw score of greater than 16 suggests the patient may benefit from discharge to home. Please also refer to the recommendation of the Physical Therapist for safe discharge planning.       Goals: Pt will: Perform bed mobility tasks with modified I to reposition in bed and prepare for transfers. Pt will perform transfers with modified I to increase Indep in home environment and prepare for ambulation. Pt will ambulate with RW for >/= 150 ft with  Supervision  to decrease risk for falls, improve activity tolerance, improve gait quality, and promote proper use of assistive device and to access home environment. Pt will complete >/= 5 steps with with unilateral handrail with Supervision to return to home with CORINE. Pt will participate in objective balance assessment to determine baseline fall risk.        Justus Beavers, PT,DPT

## 2024-02-13 NOTE — ASSESSMENT & PLAN NOTE
Patient without symptoms of UTI however positive UA and reflex to culture with Positive BC   Start ceftriaxone   Trend WBC

## 2024-02-14 PROBLEM — E87.1 HYPONATREMIA: Status: RESOLVED | Noted: 2024-02-12 | Resolved: 2024-02-14

## 2024-02-14 PROBLEM — N30.01 ACUTE CYSTITIS WITH HEMATURIA: Status: ACTIVE | Noted: 2024-02-13

## 2024-02-14 LAB
ANION GAP SERPL CALCULATED.3IONS-SCNC: 8 MMOL/L
BUN SERPL-MCNC: 23 MG/DL (ref 5–25)
CALCIUM SERPL-MCNC: 8.6 MG/DL (ref 8.4–10.2)
CHLORIDE SERPL-SCNC: 100 MMOL/L (ref 96–108)
CO2 SERPL-SCNC: 27 MMOL/L (ref 21–32)
CREAT SERPL-MCNC: 1.45 MG/DL (ref 0.6–1.3)
DME PARACHUTE DELIVERY DATE REQUESTED: NORMAL
DME PARACHUTE DELIVERY NOTE: NORMAL
DME PARACHUTE ITEM DESCRIPTION: NORMAL
DME PARACHUTE ORDER STATUS: NORMAL
DME PARACHUTE SUPPLIER NAME: NORMAL
DME PARACHUTE SUPPLIER PHONE: NORMAL
ERYTHROCYTE [DISTWIDTH] IN BLOOD BY AUTOMATED COUNT: 13.5 % (ref 11.6–15.1)
GFR SERPL CREATININE-BSD FRML MDRD: 38 ML/MIN/1.73SQ M
GLUCOSE SERPL-MCNC: 122 MG/DL (ref 65–140)
HCT VFR BLD AUTO: 30.8 % (ref 34.8–46.1)
HGB BLD-MCNC: 10.6 G/DL (ref 11.5–15.4)
MAGNESIUM SERPL-MCNC: 2.1 MG/DL (ref 1.9–2.7)
MCH RBC QN AUTO: 39.1 PG (ref 26.8–34.3)
MCHC RBC AUTO-ENTMCNC: 34.4 G/DL (ref 31.4–37.4)
MCV RBC AUTO: 114 FL (ref 82–98)
PLATELET # BLD AUTO: 147 THOUSANDS/UL (ref 149–390)
PMV BLD AUTO: 10.3 FL (ref 8.9–12.7)
POTASSIUM SERPL-SCNC: 3.1 MMOL/L (ref 3.5–5.3)
RBC # BLD AUTO: 2.71 MILLION/UL (ref 3.81–5.12)
SODIUM SERPL-SCNC: 135 MMOL/L (ref 135–147)
WBC # BLD AUTO: 3.79 THOUSAND/UL (ref 4.31–10.16)

## 2024-02-14 PROCEDURE — 87040 BLOOD CULTURE FOR BACTERIA: CPT

## 2024-02-14 PROCEDURE — 99223 1ST HOSP IP/OBS HIGH 75: CPT | Performed by: INTERNAL MEDICINE

## 2024-02-14 PROCEDURE — 83735 ASSAY OF MAGNESIUM: CPT

## 2024-02-14 PROCEDURE — 85027 COMPLETE CBC AUTOMATED: CPT

## 2024-02-14 PROCEDURE — 97167 OT EVAL HIGH COMPLEX 60 MIN: CPT

## 2024-02-14 PROCEDURE — 92610 EVALUATE SWALLOWING FUNCTION: CPT

## 2024-02-14 PROCEDURE — 99232 SBSQ HOSP IP/OBS MODERATE 35: CPT

## 2024-02-14 PROCEDURE — 80048 BASIC METABOLIC PNL TOTAL CA: CPT

## 2024-02-14 RX ORDER — CEFTRIAXONE 2 G/50ML
2000 INJECTION, SOLUTION INTRAVENOUS EVERY 24 HOURS
Status: DISCONTINUED | OUTPATIENT
Start: 2024-02-15 | End: 2024-02-15

## 2024-02-14 RX ORDER — POTASSIUM CHLORIDE 20 MEQ/1
40 TABLET, EXTENDED RELEASE ORAL ONCE
Status: COMPLETED | OUTPATIENT
Start: 2024-02-14 | End: 2024-02-14

## 2024-02-14 RX ORDER — POTASSIUM CHLORIDE 20MEQ/15ML
40 LIQUID (ML) ORAL 2 TIMES DAILY
Status: DISCONTINUED | OUTPATIENT
Start: 2024-02-14 | End: 2024-02-14

## 2024-02-14 RX ADMIN — SUCRALFATE 1 G: 1 TABLET ORAL at 20:37

## 2024-02-14 RX ADMIN — CLONAZEPAM 0.5 MG: 0.5 TABLET ORAL at 20:36

## 2024-02-14 RX ADMIN — POTASSIUM CHLORIDE 40 MEQ: 1.5 SOLUTION ORAL at 09:26

## 2024-02-14 RX ADMIN — SUCRALFATE 1 G: 1 TABLET ORAL at 16:23

## 2024-02-14 RX ADMIN — SUCRALFATE 1 G: 1 TABLET ORAL at 07:51

## 2024-02-14 RX ADMIN — SODIUM CHLORIDE, SODIUM GLUCONATE, SODIUM ACETATE, POTASSIUM CHLORIDE, MAGNESIUM CHLORIDE, SODIUM PHOSPHATE, DIBASIC, AND POTASSIUM PHOSPHATE 75 ML/HR: .53; .5; .37; .037; .03; .012; .00082 INJECTION, SOLUTION INTRAVENOUS at 01:10

## 2024-02-14 RX ADMIN — PANTOPRAZOLE SODIUM 40 MG: 40 TABLET, DELAYED RELEASE ORAL at 16:23

## 2024-02-14 RX ADMIN — HEPARIN SODIUM 5000 UNITS: 5000 INJECTION INTRAVENOUS; SUBCUTANEOUS at 13:23

## 2024-02-14 RX ADMIN — SODIUM CHLORIDE, SODIUM GLUCONATE, SODIUM ACETATE, POTASSIUM CHLORIDE, MAGNESIUM CHLORIDE, SODIUM PHOSPHATE, DIBASIC, AND POTASSIUM PHOSPHATE 75 ML/HR: .53; .5; .37; .037; .03; .012; .00082 INJECTION, SOLUTION INTRAVENOUS at 19:21

## 2024-02-14 RX ADMIN — Medication 1000 UNITS: at 09:27

## 2024-02-14 RX ADMIN — HEPARIN SODIUM 5000 UNITS: 5000 INJECTION INTRAVENOUS; SUBCUTANEOUS at 05:22

## 2024-02-14 RX ADMIN — ACETAMINOPHEN 650 MG: 325 TABLET, FILM COATED ORAL at 13:23

## 2024-02-14 RX ADMIN — CEFTRIAXONE 1000 MG: 1 INJECTION, SOLUTION INTRAVENOUS at 09:23

## 2024-02-14 RX ADMIN — POTASSIUM CHLORIDE 40 MEQ: 1500 TABLET, EXTENDED RELEASE ORAL at 17:12

## 2024-02-14 RX ADMIN — PANTOPRAZOLE SODIUM 40 MG: 40 TABLET, DELAYED RELEASE ORAL at 07:51

## 2024-02-14 RX ADMIN — BUPROPION HYDROCHLORIDE 300 MG: 150 TABLET, EXTENDED RELEASE ORAL at 09:27

## 2024-02-14 RX ADMIN — SUCRALFATE 1 G: 1 TABLET ORAL at 11:14

## 2024-02-14 NOTE — MALNUTRITION/BMI
This medical record reflects one or more clinical indicators suggestive of malnutrition.    Malnutrition Findings:   Adult Malnutrition type: Chronic illness  Adult Degree of Malnutrition: Malnutrition of mild degree  Malnutrition Characteristics: Inadequate energy, Weight loss                  360 Statement: malnutrition of mild degree in setting of chronic illness, evidenced by wt loss of 40 lbs/PT over the past year due to inability to tolerate po intake, poor po inake for > 1 month, treated with diet and supplements    BMI Findings:           Body mass index is 22.71 kg/m².     See Nutrition note dated 2/14/24 for additional details.  Completed nutrition assessment is viewable in the nutrition documentation.

## 2024-02-14 NOTE — ASSESSMENT & PLAN NOTE
Both K and Mg low on admission   Patient declines IV K, will try oral solution - 40 meq BID today   Continue to trend - improving     Lab Results   Component Value Date    K 3.1 (L) 02/14/2024    K 3.1 (L) 02/13/2024    MG 2.1 02/14/2024    MG 2.2 02/13/2024

## 2024-02-14 NOTE — ASSESSMENT & PLAN NOTE
Patient without symptoms of UTI however positive UA and reflex to culture with Positive BC   Start ceftriaxone   Trend WBC  Patient started with hematuria 2/14, DVT prophylaxis held and if persisting would consult urology  Urinary retention protocol

## 2024-02-14 NOTE — CASE MANAGEMENT
Case Management Assessment & Discharge Planning Note    Patient name Lizette Cummings  Location /-01 MRN [id number]  : 1960 Date 2024       Current Admission Date: 2024  Current Admission Diagnosis:MAXI (acute kidney injury) (HCC)   Patient Active Problem List    Diagnosis Date Noted    Bacteremia 2024    Acute cystitis with hematuria 2024    MAXI (acute kidney injury) (HCC) 2024    Hypertension 2024    Generalized weakness 2024    GERD (gastroesophageal reflux disease) 2024    Insomnia 2024    Electrolyte disturbance 2024      LOS (days): 2  Geometric Mean LOS (GMLOS) (days): 3.1  Days to GMLOS:1.1     OBJECTIVE:    Risk of Unplanned Readmission Score: 12.84         Current admission status: Inpatient       Preferred Pharmacy:   [facility] [address] [place] [address] [address] [facility]  [address] [facility]   Phone: [phone] Fax: [phone]    [id number] [address]  [address] [address]  Phone: [phone] Fax: [phone]    Primary Care Provider: Susan Kelly DO    Primary Insurance: MEDICARE  Secondary Insurance: [facility]    ASSESSMENT:  Active Health Care Proxies    There are no active Health Care Proxies on file.       Advance Directives  Does patient have a Health Care POA?: No  Was patient offered paperwork?: Yes (declined)  Does patient currently have a Health Care decision maker?: No  Does patient have Advance Directives?: No  Was patient offered paperwork?: Yes (declined)  Primary Contact: , Jayy              Patient Information  Admitted from:: Home  Mental Status: Alert  During Assessment patient was accompanied by: Spouse  Assessment information provided by:: Patient  Primary Caregiver: Self  Support Systems: Spouse/significant other, Family members  County [facility] Residence: [facility]  Home entry access options.  Select all that apply.: Stairs  Number of steps to enter home.: 5  Type of Current Residence: [facility]  Living Arrangements: Lives w/ Spouse/significant other (10 yo grandchild also lives wiht pt)    Activities of Daily Living Prior to Admission  Functional Status: Independent  Completes ADLs independently?: Yes  Ambulates independently?: Yes  Does patient use assisted devices?: Yes  Assisted Devices (DME) used: Walker  Does patient currently own DME?: Yes  What DME does the patient currently own?: Walker  Does patient have a history of Outpatient Therapy (PT/OT)?: No  Does the patient have a history of Short-Term Rehab?: No  Does patient have a history of HHC?: No  Does patient currently have HHC?: No         Patient Information Continued  Income Source: SSI/SSD  Does patient have prescription coverage?: Yes  Does patient receive dialysis treatments?: No  Does patient have a history of substance abuse?: No         Means of Transportation  Means of Transport to Appts:: Drives Self      Social Determinants of Health (SDOH)      Flowsheet Row Most Recent Value   Housing Stability    In the last 12 months, was there a time when you were not able to pay the mortgage or rent on time? N   In the last 12 months, how many places have you lived? 1   In the last 12 months, was there a time when you did not have a steady place to sleep or slept in a shelter (including now)? N   Food Insecurity    Within the past 12 months, you worried that your food would run out before you got the money to buy more. Never true   Within the past 12 months, the food you bought just didn't last and you didn't have money to get more. Never true   Utilities    In the past 12 months has the electric, gas, oil, or water company threatened to shut off services in your home? No            DISCHARGE DETAILS:    Discharge planning discussed with:: pt and , who is at bedside  [place] of Choice: Yes     CM contacted family/caregiver?: Yes  Were  Treatment Team discharge recommendations reviewed with patient/caregiver?: Yes  Did patient/caregiver verbalize understanding of patient care needs?: Yes  Were patient/caregiver advised of the risks associated with not following Treatment Team discharge recommendations?: Yes    Contacts  Patient Contacts: , Jayy  Relationship to Patient:: Family

## 2024-02-14 NOTE — CASE MANAGEMENT
Case Management Discharge Planning Note    Patient name Lizette Cummings  Location /-01 MRN [id number]  : 1960 Date 2024       Current Admission Date: 2024  Current Admission Diagnosis:MAXI (acute kidney injury) (HCC)   Patient Active Problem List    Diagnosis Date Noted    Bacteremia 2024    Acute cystitis with hematuria 2024    MAXI (acute kidney injury) (HCC) 2024    Hypertension 2024    Generalized weakness 2024    GERD (gastroesophageal reflux disease) 2024    Insomnia 2024    Electrolyte disturbance 2024      LOS (days): 2  Geometric Mean LOS (GMLOS) (days): 3.1  Days to GMLOS:1.2     OBJECTIVE:  Risk of Unplanned Readmission Score: 12.84         Current admission status: Inpatient   Preferred Pharmacy:   [facility] [address] [place] [address] - [address] [place]  [address] [facility]   Phone: [phone] Fax: [phone]    [id number] [place], [place] - [address]  [address] [address]  Phone: [phone] Fax: [phone]    Primary Care Provider: Susan Kelly DO    Primary Insurance: MEDICARE  Secondary Insurance: [facility]    DISCHARGE DETAILS:        HHC has been recommended at time of discharge, pt is agreeable.  Pt is agreeable to have CM place blanket referrals in [place].      BSC has been recommended for pt at time of discharge, Cm placing an order for [facility] to [facility] in [place].             [facility] has accepted pt at time of discharge

## 2024-02-14 NOTE — ASSESSMENT & PLAN NOTE
Creatine on admission 2.16, Baseline creatinine normal (0.8)  Etiology poor oral intake secondary to severe reflux and nausea  Imaging CT abdomen pelvis unrevealing for acute pathology, moderate hiatal hernia    Lab Results   Component Value Date    CREATININE 1.45 (H) 02/14/2024    CREATININE 1.65 (H) 02/13/2024     Hold spironolactone  Avoid hypotension, nephrotoxins, and NSAIDS if possible    Urine studies orders - calculate FeNA - prerenal   IV fluids - improving, continue IVF  Strict I & Os  Treat underlying UTI

## 2024-02-14 NOTE — ASSESSMENT & PLAN NOTE
Presents to ED with poor oral intake   Sodium on admission 129 (corrected for hyperglycemia is 130)  Hypovolemic on initial exam   Potential etiology poor oral intake   TSH ordered - wnl     IVF gentle  Hyponatremia has resolved  Lab Results   Component Value Date    SODIUM 135 02/14/2024    SODIUM 131 (L) 02/13/2024         Lab Results   Component Value Date    SODIUM 135 02/14/2024    SODIUM 131 (L) 02/13/2024    SODIUM 131 (L) 02/13/2024

## 2024-02-14 NOTE — PLAN OF CARE
Problem: Potential for Falls  Goal: Patient will remain free of falls  Description: INTERVENTIONS:  - Educate patient/family on patient safety including physical limitations  - Instruct patient to call for assistance with activity   - Consult OT/PT to assist with strengthening/mobility   - Keep Call bell within reach  - Keep bed low and locked with side rails adjusted as appropriate  - Keep care items and personal belongings within reach  - Initiate and maintain comfort rounds  - Make Fall Risk Sign visible to staff  - Offer Toileting every 2 Hours, in advance of need  - Initiate/Maintain   alarm  - Obtain necessary fall risk management equipment:     - Apply yellow socks and bracelet for high fall risk patients  - Consider moving patient to room near nurses station  Outcome: Progressing     Problem: Nutrition/Hydration-ADULT  Goal: Nutrient/Hydration intake appropriate for improving, restoring or maintaining nutritional needs  Description: Monitor and assess patient's nutrition/hydration status for malnutrition. Collaborate with interdisciplinary team and initiate plan and interventions as ordered.  Monitor patient's weight and dietary intake as ordered or per policy. Utilize nutrition screening tool and intervene as necessary. Determine patient's food preferences and provide high-protein, high-caloric foods as appropriate.     INTERVENTIONS:  - Monitor oral intake, urinary output, labs, and treatment plans  - Assess nutrition and hydration status and recommend course of action  - Evaluate amount of meals eaten  - Assist patient with eating if necessary   - Allow adequate time for meals  - Recommend/ encourage appropriate diets, oral nutritional supplements, and vitamin/mineral supplements  - Order, calculate, and assess calorie counts as needed  - Recommend, monitor, and adjust tube feedings and TPN/PPN based on assessed needs  - Assess need for intravenous fluids  - Provide specific nutrition/hydration  education as appropriate  - Include patient/family/caregiver in decisions related to nutrition  Outcome: Progressing     Problem: GASTROINTESTINAL - ADULT  Goal: Minimal or absence of nausea and/or vomiting  Description: INTERVENTIONS:  - Administer IV fluids if ordered to ensure adequate hydration  - Maintain NPO status until nausea and vomiting are resolved  - Nasogastric tube if ordered  - Administer ordered antiemetic medications as needed  - Provide nonpharmacologic comfort measures as appropriate  - Advance diet as tolerated, if ordered  - Consider nutrition services referral to assist patient with adequate nutrition and appropriate food choices  Outcome: Progressing  Goal: Maintains or returns to baseline bowel function  Description: INTERVENTIONS:  - Assess bowel function  - Encourage oral fluids to ensure adequate hydration  - Administer IV fluids if ordered to ensure adequate hydration  - Administer ordered medications as needed  - Encourage mobilization and activity  - Consider nutritional services referral to assist patient with adequate nutrition and appropriate food choices  Outcome: Progressing  Goal: Maintains adequate nutritional intake  Description: INTERVENTIONS:  - Monitor percentage of each meal consumed  - Identify factors contributing to decreased intake, treat as appropriate  - Assist with meals as needed  - Monitor I&O, weight, and lab values if indicated  - Obtain nutrition services referral as needed  Outcome: Progressing  Goal: Oral mucous membranes remain intact  Description: INTERVENTIONS  - Assess oral mucosa and hygiene practices  - Implement preventative oral hygiene regimen  - Implement oral medicated treatments as ordered  - Initiate Nutrition services referral as needed  Outcome: Progressing

## 2024-02-14 NOTE — ASSESSMENT & PLAN NOTE
Patient has longstanding history of GERD with moderate-sized hiatal hernia  She takes Protonix 40 mg twice daily  Has seen otolaryngology in the past for chronic cough and was recommended to see GI, has not followed up  Last EGD around 20 to 30 years ago    Consult GI  EGD 2/13 with severe esophagitis, biopsy sent  Patient currently on clear liquid number ulcerogenic diet -advance as tolerated

## 2024-02-14 NOTE — PLAN OF CARE
Problem: OCCUPATIONAL THERAPY ADULT  Goal: Performs self-care activities at highest level of function for planned discharge setting.  See evaluation for individualized goals.  Description: Treatment Interventions: ADL retraining, Functional transfer training, UE strengthening/ROM, Endurance training, Equipment evaluation/education, Compensatory technique education, Continued evaluation, Energy conservation, Activity engagement    See flowsheet documentation for full assessment, interventions and recommendations.   Outcome: Progressing  Note: Limitation: Decreased ADL status, Decreased cognition, Decreased endurance, Decreased fine motor control, Decreased self-care trans, Decreased high-level ADLs  Prognosis: Good  Assessment: Lizette is a 63 y.o. female admitted to [facility] 2/12/2024 with admitting diagnosis: MAXI. Pt with PMHx impacting their performance during ADL tasks, including: UTI, bacteremia, insomnia, generalized weakness, hyponatremia. Prior to admission to the hospital pt was performing ADLs with physical assistance. IADLs with physical assistance. Functional transfers/ambulation with physical assistance. Cognitive status was PTA was impaired. OT order placed to assess pt's ADLs, cognitive status, and performance during functional tasks in order to maximize safety and independence while making most appropriate d/c recommendations. PT/OT co-evaluation completed at this time d/t mobility deficits and safety concerns. Pt's clinical presentation is currently unstable/unpredictable given new onset deficits that effect pt's occupational performance and ability to safely return to OF including decrease activity tolerance, decrease standing balance, decrease performance during ADL tasks, decrease cognition, decrease generalized strength, decrease performance during functional transfers, limited family support, high fall risk, and limited insight to deficits combined with medical complications of hypertension , change  in mental status, and need for input for mobility technique/safety.  Personal factors affecting pt at time of initial evaluation include: step(s) to enter environment, inability to perform IADLs, inability to perform ADLs, new need for AD, inability to ambulate household distances, inability to navigate community distances, and limited insight into impairments. Pt will benefit from continued acute skilled OT services to address deficits as defined above and to maximize level independence/participation during ADLs and functional tasks to facilitate return toward PLOF and improved quality of life.     From an occupational therapy standpoint, recommendation at time of d/c would be Level II: Moderate Resource Intensity . The patient's raw score on the AM-PAC Daily Activity Inpatient Short Form is 20. A raw score of greater than or equal to 19 suggests the patient may benefit from discharge to post-acute rehabilitation services. Please refer to the recommendation of the Occupational Therapist for safe discharge planning.     Rehab Resource Intensity Level, OT: II (Moderate Resource Intensity)

## 2024-02-14 NOTE — PROGRESS NOTES
[facility]  Progress Note  Name: Lizette Cummings I  MRN: [id number]  Unit/Bed#: -01 I Date of Admission: 2/12/2024   Date of Service: 2/14/2024 I Hospital Day: 2    Assessment/Plan   * MAXI (acute kidney injury) (HCC)  Assessment & Plan  Creatine on admission 2.16, Baseline creatinine normal (0.8)  Etiology poor oral intake secondary to severe reflux and nausea  Imaging CT abdomen pelvis unrevealing for acute pathology, moderate hiatal hernia    Lab Results   Component Value Date    CREATININE 1.45 (H) 02/14/2024    CREATININE 1.65 (H) 02/13/2024     Hold spironolactone  Avoid hypotension, nephrotoxins, and NSAIDS if possible    Urine studies orders - calculate FeNA - prerenal   IV fluids - improving, continue IVF  Strict I & Os  Treat underlying UTI        Acute cystitis with hematuria  Assessment & Plan  Patient without symptoms of UTI however positive UA and reflex to culture with Positive BC   Start ceftriaxone   Trend WBC  Patient started with hematuria 2/14, DVT prophylaxis held and if persisting would consult urology  Urinary retention protocol    Bacteremia  Assessment & Plan  Patient with 1/2 BC positive for proteus species - other BC not resulted yet   Continue ceftriaxone  Also with positive UA for UTI as possible source   Infectious disease consult placed -recommended to continue ceftriaxone and increased dose, follow up final cultures, likely 10 days antibiotics with oral cephalosporin  Trend WBC    Electrolyte disturbance  Assessment & Plan  Both K and Mg low on admission   Patient declines IV K, will try oral solution - 40 meq BID today   Continue to trend - improving     Lab Results   Component Value Date    K 3.1 (L) 02/14/2024    K 3.1 (L) 02/13/2024    MG 2.1 02/14/2024    MG 2.2 02/13/2024         Insomnia  Assessment & Plan  Patient reports insomnia   Usually takes klonopin - PDMP reviewed. Prescribed by PCP reports not taking every night   Will order PRN      GERD (gastroesophageal reflux disease)  Assessment & Plan  Patient has longstanding history of GERD with moderate-sized hiatal hernia  She takes Protonix 40 mg twice daily  Has seen otolaryngology in the past for chronic cough and was recommended to see GI, has not followed up  Last EGD around 20 to 30 years ago    Consult GI  EGD 2/13 with severe esophagitis, biopsy sent  Patient currently on clear liquid number ulcerogenic diet -advance as tolerated    Generalized weakness  Assessment & Plan  Patient endorses some generalized weakness and fatigue likely secondary to poor oral intake  PT/OT consults    Hypertension  Assessment & Plan  Continue metoprolol (decreased dose to avoid hypotension) hold spironolactone     Hyponatremia-resolved as of 2/14/2024  Assessment & Plan  Presents to ED with poor oral intake   Sodium on admission 129 (corrected for hyperglycemia is 130)  Hypovolemic on initial exam   Potential etiology poor oral intake   TSH ordered - wnl     IVF gentle  Hyponatremia has resolved  Lab Results   Component Value Date    SODIUM 135 02/14/2024    SODIUM 131 (L) 02/13/2024         Lab Results   Component Value Date    SODIUM 135 02/14/2024    SODIUM 131 (L) 02/13/2024    SODIUM 131 (L) 02/13/2024                VTE Pharmacologic Prophylaxis:   Moderate Risk (Score 3-4) - Pharmacological DVT Prophylaxis Contraindicated. Sequential Compression Devices Ordered.    Mobility:   Basic Mobility Inpatient Raw Score: 18  JH-HLM Goal: 6: Walk 10 steps or more  JH-HLM Achieved: 7: Walk 25 feet or more  HLM Goal achieved. Continue to encourage appropriate mobility.    Patient Centered Rounds: I performed bedside rounds with nursing staff today.   Discussions with Specialists or Other Care Team Provider: CM    Education and Discussions with Family / Patient: Attempted to update  () via phone. Unable to contact. Voicemail box full     Total Time Spent on Date of Encounter in care of patient:   mins. This time was spent on one or more of the following: performing physical exam; counseling and coordination of care; obtaining or reviewing history; documenting in the medical record; reviewing/ordering tests, medications or procedures; communicating with other healthcare professionals and discussing with patient's family/caregivers.    Current Length of Stay: 2 day(s)  Current Patient Status: Inpatient   Certification Statement: The patient will continue to require additional inpatient hospital stay due to UTI, MAXI and bacteremia  Discharge Plan: Anticipate discharge in 48 hrs to discharge location to be determined pending rehab evaluations.    Code Status: Level 1 - Full Code    Subjective:   Seen and examined.  States now having blood in her urine     Objective:     Vitals:   Temp (24hrs), Av.9 °F (37.2 °C), Min:97.5 °F (36.4 °C), Max:99.6 °F (37.6 °C)    Temp:  [97.5 °F (36.4 °C)-99.6 °F (37.6 °C)] 97.5 °F (36.4 °C)  HR:  [77-95] 95  Resp:  [18-20] 18  BP: ()/(50-67) 102/65  SpO2:  [90 %-98 %] 95 %  Body mass index is 22.71 kg/m².     Input and Output Summary (last 24 hours):     Intake/Output Summary (Last 24 hours) at 2024 1555  Last data filed at 2024 1501  Gross per 24 hour   Intake 340 ml   Output 600 ml   Net -260 ml       Physical Exam:   Physical Exam  Vitals and nursing note reviewed.   Constitutional:       General: She is not in acute distress.     Appearance: Normal appearance.   HENT:      Head: Normocephalic and atraumatic.      Nose: No congestion.      Mouth/Throat:      Mouth: Mucous membranes are moist.   Eyes:      Conjunctiva/sclera: Conjunctivae normal.   Cardiovascular:      Rate and Rhythm: Normal rate and regular rhythm.      Pulses: Normal pulses.      Heart sounds: Normal heart sounds. No murmur heard.  Pulmonary:      Effort: Pulmonary effort is normal. No respiratory distress.      Breath sounds: Normal breath sounds.   Abdominal:      General: Bowel sounds  are normal.      Palpations: Abdomen is soft.      Tenderness: There is no abdominal tenderness.   Musculoskeletal:         General: Normal range of motion.      Right lower leg: No edema.      Left lower leg: No edema.   Skin:     General: Skin is warm and dry.   Neurological:      Mental Status: She is alert and oriented to person, place, and time.          Additional Data:     Labs:  Results from last 7 days   Lab Units 02/14/24  0519 02/13/24  0436 02/12/24  1438   WBC Thousand/uL 3.79*   < > 6.96   HEMOGLOBIN g/dL 10.6*   < > 11.8   HEMATOCRIT % 30.8*   < > 33.7*   PLATELETS Thousands/uL 147*   < > 172   NEUTROS PCT %  --   --  71   LYMPHS PCT %  --   --  10*   MONOS PCT %  --   --  19*   EOS PCT %  --   --  0    < > = values in this interval not displayed.     Results from last 7 days   Lab Units 02/14/24  0519 02/13/24  1945 02/13/24  0436   SODIUM mmol/L 135   < > 131*   POTASSIUM mmol/L 3.1*   < > 3.4*   CHLORIDE mmol/L 100   < > 97   CO2 mmol/L 27   < > 25   BUN mg/dL 23   < > 27*   CREATININE mg/dL 1.45*   < > 1.70*   ANION GAP mmol/L 8   < > 9   CALCIUM mg/dL 8.6   < > 8.7   ALBUMIN g/dL  --   --  2.9*   TOTAL BILIRUBIN mg/dL  --   --  1.73*   ALK PHOS U/L  --   --  89   ALT U/L  --   --  35   AST U/L  --   --  74*   GLUCOSE RANDOM mg/dL 122   < > 126    < > = values in this interval not displayed.                 Results from last 7 days   Lab Units 02/12/24  1438   LACTIC ACID mmol/L 1.1       Lines/Drains:  Invasive Devices       Peripheral Intravenous Line  Duration             Peripheral IV 02/12/24 Left Antecubital 2 days              Drain  Duration             External Urinary Catheter 1 day                          Imaging: Reviewed radiology reports from this admission including: procedure reports    Recent Cultures (last 7 days):   Results from last 7 days   Lab Units 02/13/24  0101 02/12/24  1438   BLOOD CULTURE   --  Proteus mirabilis*   GRAM STAIN RESULT   --  Gram negative rods*  Gram  negative rods*   URINE CULTURE  Culture results to follow.  --        Last 24 Hours Medication List:   Current Facility-Administered Medications   Medication Dose Route Frequency Provider Last Rate    acetaminophen  650 mg Oral Q6H PRN Ana Maria Morillo PA-C      albuterol  2.5 mg Nebulization Q6H PRN Ana Maria Morillo PA-C      aluminum-magnesium hydroxide-simethicone  30 mL Oral Q6H PRN Ana Maria Morillo PA-C      buPROPion  300 mg Oral Daily Ana Maria Morillo PA-C      [START ON 2/15/2024] cefTRIAXone  2,000 mg Intravenous Q24H Diamond Tucker MD      cholecalciferol  1,000 Units Oral Daily Ana Maria Morillo PA-C      clonazePAM  0.5 mg Oral HS PRN Ana Maria Morillo PA-C      metoprolol succinate  50 mg Oral BID Ana Maria Morillo PA-C      multi-electrolyte  75 mL/hr Intravenous Continuous Ana Maria Morillo PA-C 75 mL/hr (02/14/24 0110)    pantoprazole  40 mg Oral BID AC Ana Maria Morillo PA-C      potassium chloride  40 mEq Oral BID Ana Maria Morillo PA-C      sucralfate  1 g Oral 4x Daily (AC & HS) Ana Maria Morillo PA-C          Today, Patient Was Seen By: Ana Maria Morillo PA-C    **Please Note: This note may have been constructed using a voice recognition system.**

## 2024-02-14 NOTE — CONSULTS
Consultation - Infectious Disease   Lizette Cummings 63 y.o. female MRN: [id number]  Unit/Bed#: -01 Encounter: [id number]      Inpatient consult to Infectious Diseases  Consult performed by: Diamond Tucker MD  Consult ordered by: Ana Maria Morillo PA-C            REQUIRED DOCUMENTATION:     1. This service was provided via Telemedicine.  2. Provider located at [place].  3. TeleMed provider: Diamond Tucker MD.  4. Identify all parties in room with patient during tele consult:RN  5. After connecting through [employer], patient was identified by name and date of birth and assistant checked wristband.  Patient was then informed that this was a Telemedicine visit and that the exam was being conducted confidentially over secure lines. My office door was closed. No one else was in the room.  Patient acknowledged consent and understanding of privacy and security of the Telemedicine visit, and gave us permission to have the assistant stay in the room in order to assist with the history and to conduct the exam.  I informed the patient that I have reviewed their record in Epic and presented the opportunity for them to ask any questions regarding the visit today.  The patient agreed to participate.       Assessment/Recommendations     Proteus bacteremia  Possible complicated UTI  Possible chronic sinusitis  Severe esophagitis  Acute kidney injury    -Patient presenting with several weeks of generalized weakness, upper respiratory and upper GI symptoms.  She is without evidence of sepsis but noted to have positive blood cultures for Proteus.  No obvious source of bacteremia, consider UTI in the setting of pyuria and new hematuria but patient has no localizing symptoms.  GI translocation is less likely.  No pneumonia on chest imaging, no open wounds.  -Afebrile with mild leukopenia    Continue ceftriaxone adjusted dose to 2 every 24  Follow-up blood and urine cultures  Continue supportive care, monitor clinical  symptoms.  If hematuria persists or worsens would recommend inpatient urology evaluation  If patient has ongoing nasal congestion, cough would recommend ENT evaluation  Management of esophagitis as per GI  Check Daily CBC, CMP to monitor for any evolving antibiotic toxicity or treatment failure  Based on susceptibility, anticipate transition to an oral cephalosporin to complete 10 days of therapy    CT abdomen images personally reviewed and show moderate-sized hiatal hernia, s/p partial colectomy, without any focus of infection    Discussed above plan in detail with the primary service who is in agreement.    History     Reason for Consult: Bacteremia  HPI: Lizette Cummings is a 63 y.o. year old female with history of bowel resection, GERD, chronic diarrhea and hx long covid.  She has not been feeling well for the past 6 weeks and reports developing severe nasal congestion, postnasal drip and cough 1 week before Luisa.  Since then she has had persistent symptoms of nasal congestion as well as poor oral intake, generalized weakness, fatigue, intermittent nausea and vomiting.  She was brought to the ER on 2/12 by her  when he was concerned that she may have developed hallucinations.  She denies any fever, chills, night sweats.  No reports of worsening diarrhea from baseline, abdominal pain, urinary symptoms such as dysuria or frequency or urgency.  She has no open wounds.  Workup has included an endoscopy with evidence of severe esophagitis and gastritis.  Blood cultures from admission are growing Proteus.  Today she has noted marcos blood in urine but without pain.  Infectious disease is being consulted for diagnostic work up and antibiotic management.    Review of Systems  Pertinent positives and negatives as noted in HPI. Rest complete 12 point system-based review of systems is otherwise negative.    PAST MEDICAL HISTORY:  Past Medical History:   Diagnosis Date    Anxiety     Diverticular disease      GERD (gastroesophageal reflux disease)     Hypertension      Past Surgical History:   Procedure Laterality Date    BOWEL RESECTION      HERNIA REPAIR      KNEE ARTHROSCOPY      SHOULDER SURGERY Left        FAMILY HISTORY:  Non-contributory    SOCIAL HISTORY:  Social History   /Civil Union  Social History     Substance and Sexual Activity   Alcohol Use Yes    Comment: social     Social History     Substance and Sexual Activity   Drug Use Never     Social History     Tobacco Use   Smoking Status Former    Current packs/day: 0.00    Types: Cigarettes    Quit date:     Years since quittin.1   Smokeless Tobacco Never       ALLERGIES:  Allergies   Allergen Reactions    Ace Inhibitors Anaphylaxis    Other Anaphylaxis     arb    Ciprofloxacin Rash    Sulfa Antibiotics Rash       MEDICATIONS:  All current active medications have been reviewed.    Physical Exam     Temp:  [97.5 °F (36.4 °C)-99.6 °F (37.6 °C)] 97.5 °F (36.4 °C)  HR:  [] 95  Resp:  [18-20] 18  BP: ()/(50-67) 102/65  SpO2:  [90 %-98 %] 95 %  Temp (24hrs), Av.8 °F (37.1 °C), Min:97.5 °F (36.4 °C), Max:99.6 °F (37.6 °C)  Current: Temperature: 97.5 °F (36.4 °C)    Intake/Output Summary (Last 24 hours) at 2024 1238  Last data filed at 2024 1013  Gross per 24 hour   Intake 340 ml   Output 400 ml   Net -60 ml         Physical exam findings reported by bedside and primary medical team staff    General Appearance:  Appearing fatigued and chronically ill, nontoxic, and in no distress, appears stated age   Head:  Normocephalic, without obvious abnormality, atraumatic   Eyes:  PERRL, conjunctiva pink and sclera anicteric, both eyes   Nose: Nares normal, mucosa normal, no drainage   Throat: Oropharynx moist without lesions; lips, mucosa, and tongue normal; teeth and gums normal   Neck: Supple, symmetrical, trachea midline, no adenopathy, no tenderness/mass/nodules   Back:   Symmetric, no curvature, ROM normal, no CVA tenderness    Lungs:   Clear to auscultation bilaterally, no audible wheezes, rhonchi and rales, respirations unlabored   Chest Wall:  No tenderness or deformity   Heart:  Regular rate and rhythm, S1, S2 normal, no murmur, rub or gallop   Abdomen:   Soft, non-tender, non-distended, positive bowel sounds, no masses, no organomegaly    No CVA tenderness   Extremities: Extremities normal, atraumatic, no cyanosis, clubbing , trace edema   Skin: Skin color, texture, turgor normal, no rashes or lesions. No draining wounds noted.   Lymph nodes: Cervical, supraclavicular, and axillary nodes normal   Neurologic: Alert and oriented times 3       Invasive Devices:   Peripheral IV 02/12/24 Left Antecubital (Active)   Site Assessment WDL;Clean;Dry;Intact 02/14/24 1000   Dressing Type Transparent 02/14/24 1000   Line Status Infusing 02/14/24 1000   Dressing Status Clean;Dry;Intact 02/14/24 1000   Dressing Change Due 02/16/24 02/14/24 1000   Reason Not Rotated Not due 02/13/24 2000       External Urinary Catheter (Active)       Labs, Imaging, & Other Studies     Lab Results:    I have personally reviewed pertinent labs.    Results from last 7 days   Lab Units 02/14/24  0519 02/13/24  0436 02/12/24  1438   WBC Thousand/uL 3.79* 3.52* 6.96   HEMOGLOBIN g/dL 10.6* 9.9* 11.8   PLATELETS Thousands/uL 147* 141* 172     Results from last 7 days   Lab Units 02/14/24  0519 02/13/24  1945 02/13/24  0436 02/12/24  1438   POTASSIUM mmol/L 3.1*   < > 3.4* 3.2*   CHLORIDE mmol/L 100   < > 97 91*   CO2 mmol/L 27   < > 25 22   BUN mg/dL 23   < > 27* 28*   CREATININE mg/dL 1.45*   < > 1.70* 2.16*   EGFR ml/min/1.73sq m 38   < > 31 23   CALCIUM mg/dL 8.6   < > 8.7 9.5   AST U/L  --   --  74* 96*   ALT U/L  --   --  35 49   ALK PHOS U/L  --   --  89 102    < > = values in this interval not displayed.     Results from last 7 days   Lab Units 02/12/24  5852   GRAM STAIN RESULT  Gram negative rods*  Gram negative rods*       Imaging Studies:   I have personally  reviewed pertinent imaging study reports and images in PACS.      EKG, Pathology, and Other Studies:   I have personally reviewed pertinent reports and reviewed external records.    Counseling/Coordination of care:       Total 90 minutes communication with the patient via telehealth.  Labs, medical tests and imaging studies were independently and extensively reviewed by me as noted above in HPI and old records were obtained and summarized as noted above in HPI.   My recommendations were discussed with the patient in detail who verbalized understanding.

## 2024-02-14 NOTE — OCCUPATIONAL THERAPY NOTE
Occupational Therapy Evaluation     Patient Name: Lizette Cummings  Today's Date: 2/14/2024  Problem List  Principal Problem:    MAXI (acute kidney injury) (Formerly [facility])  Active Problems:    Hyponatremia    Hypertension    Generalized weakness    GERD (gastroesophageal reflux disease)    Insomnia    Electrolyte disturbance    Bacteremia    UTI (urinary tract infection)    Past Medical History  Past Medical History:   Diagnosis Date    Anxiety     Diverticular disease     GERD (gastroesophageal reflux disease)     Hypertension      Past Surgical History  Past Surgical History:   Procedure Laterality Date    BOWEL RESECTION      HERNIA REPAIR      KNEE ARTHROSCOPY      SHOULDER SURGERY Left         02/14/24 0854   OT Last Visit   OT Visit Date 02/14/24   Note Type   Note type Evaluation   Pain Assessment   Pain Assessment Tool 0-10   Pain Score No Pain   Restrictions/Precautions   Weight Bearing Precautions Per Order No   Other Precautions Chair Alarm;Bed Alarm;Fall Risk;Multiple lines   Home Living   Type of Home House   Home Layout One level;Performs ADLs on one level;Able to live on main level with bedroom/bathroom  (4-5 deck steps + 1 thruogh doorway B rails;)   Bathroom Shower/Tub Walk-in shower   Bathroom Toilet Standard   Bathroom Equipment Grab bars in shower;Built-in shower seat;Grab bars around toilet   Home Equipment Cane   Prior Function   Level of [place] Needs assistance with ADLs;Needs assistance with IADLS;Independent with functional mobility  (spouse assists with showers; spouse intermittently assists with dressing depending upon attire; spouse also provides light assist for CORINE home)   Lives With Spouse  (and 11 year old granddaughter)   Receives Help From Family   IADLs Family/Friend/Other provides meals;Family/Friend/Other provides transportation;Family/Friend/Other provides medication management   Falls in the last 6 months 1 to 4  (2)   Vocational Unemployed   Comments Mod (I) functional  "mobility within the home most of the time, no AD. \"Sometimes I hold onto the walls and furniture.\" Intermittent assist to get off of toilet at home. Does not go out often. Pt reports her  assists with all ADLs and IADLs at baseline.   ADL   Where Assessed Edge of bed   Grooming Assistance 5  Supervision/Setup   LB Bathing Assistance 4  Minimal Assistance   LB Bathing Deficit Perineal area;Buttocks  (standing with RW due to impaired standing balance)   UB Dressing Assistance 5  Supervision/Setup   UB Dressing Deficit   (managing hospital gown)   LB Dressing Assistance 2  Maximal Assistance   LB Dressing Deficit Don/doff R sock;Don/doff L sock;Thread RLE into underwear;Thread LLE into underwear  (incontinence briefs)   Toileting Deficit   (declined need; anticipate Min A based on functional assessment of ADL performance, strength, and balance)   Additional Comments Seated EOB x 4-5 minutes completing AM ADLs   Bed Mobility   Supine to Sit 5  Supervision   Transfers   Sit to Stand   (SBA)   Additional items Verbal cues;Increased time required   Stand to Sit   (SBA)   Additional items Increased time required;Verbal cues   Stand pivot   (SBA)   Additional items Assist x 1;Increased time required;Verbal cues   Additional Comments Pt declined need for RW initially. Educated about improving balance with RW in stance; receptive to feedback. Cued for safe hand placement prior to descent.   Balance   Static Sitting Good   Dynamic Sitting Fair +   Static Standing Fair   Dynamic Standing Fair   Activity Tolerance   Activity Tolerance Patient limited by fatigue   RUE Assessment   RUE Assessment WFL   LUE Assessment   LUE Assessment WFL   Cognition   Overall Cognitive Status Impaired   Arousal/Participation Alert;Cooperative   Attention Within functional limits   Orientation Level Oriented X4   Memory Decreased short term memory   Following Commands Follows one step commands with increased time or repetition   Assessment "   Limitation Decreased ADL status;Decreased cognition;Decreased endurance;Decreased fine motor control;Decreased self-care trans;Decreased high-level ADLs   Prognosis Good   Assessment Lizette is a 63 y.o. female admitted to [facility] 2/12/2024 with admitting diagnosis: MAXI. Pt with PMHx impacting their performance during ADL tasks, including: UTI, bacteremia, insomnia, generalized weakness, hyponatremia. Prior to admission to the hospital pt was performing ADLs with physical assistance. IADLs with physical assistance. Functional transfers/ambulation with physical assistance. Cognitive status was PTA was impaired. OT order placed to assess pt's ADLs, cognitive status, and performance during functional tasks in order to maximize safety and independence while making most appropriate d/c recommendations. PT/OT co-evaluation completed at this time d/t mobility deficits and safety concerns. Pt's clinical presentation is currently unstable/unpredictable given new onset deficits that effect pt's occupational performance and ability to safely return to PLOF including decrease activity tolerance, decrease standing balance, decrease performance during ADL tasks, decrease cognition, decrease generalized strength, decrease performance during functional transfers, limited family support, high fall risk, and limited insight to deficits combined with medical complications of hypertension , change in mental status, and need for input for mobility technique/safety.  Personal factors affecting pt at time of initial evaluation include: step(s) to enter environment, inability to perform IADLs, inability to perform ADLs, new need for AD, inability to ambulate household distances, inability to navigate community distances, and limited insight into impairments. Pt will benefit from continued acute skilled OT services to address deficits as defined above and to maximize level independence/participation during ADLs and functional tasks to facilitate  return toward PLOF and improved quality of life.     From an occupational therapy standpoint, recommendation at time of d/c would be Level II: Moderate Resource Intensity . The patient's raw score on the AM-PAC Daily Activity Inpatient Short Form is 20. A raw score of greater than or equal to 19 suggests the patient may benefit from discharge to post-acute rehabilitation services. Please refer to the recommendation of the Occupational Therapist for safe discharge planning.   Plan   Treatment Interventions ADL retraining;Functional transfer training;UE strengthening/ROM;Endurance training;Equipment evaluation/education;Compensatory technique education;Continued evaluation;Energy conservation;Activityengagement   Goal Expiration Date 02/28/24   OT Frequency 3-5x/wk   Discharge Recommendation   Rehab Resource Intensity Level, OT II (Moderate Resource Intensity)   AM-PAC Daily Activity Inpatient   Lower Body Dressing 3   Bathing 3   Toileting 3   Upper Body Dressing 3   Grooming 4   Eating 4   Daily Activity Raw Score 20   Daily Activity Standardized Score (Calc for Raw Score >=11) 42.03   AM-[facility] Applied Cognition Inpatient   Following a Speech/Presentation 3   Understanding Ordinary Conversation 4   Taking Medications 3   Remembering Where Things Are Placed or Put Away 2   Remembering List of 4-5 Errands 2   Taking Care of Complicated Tasks 2   Applied Cognition Raw Score 16   Applied Cognition Standardized Score 35.03        Assessment:  Lizette is a 63 y.o. female admitted to [facility] 2/12/2024 with admitting diagnosis: MAXI. Pt with PMHx impacting their performance during ADL tasks, including: UTI, bacteremia, insomnia, generalized weakness, hyponatremia. Prior to admission to the hospital pt was performing ADLs with physical assistance. IADLs with physical assistance. Functional transfers/ambulation with physical assistance. Cognitive status was PTA was impaired. OT order placed to assess pt's ADLs, cognitive status, and  performance during functional tasks in order to maximize safety and independence while making most appropriate d/c recommendations. PT/OT co-evaluation completed at this time d/t mobility deficits and safety concerns. Pt's clinical presentation is currently unstable/unpredictable given new onset deficits that effect pt's occupational performance and ability to safely return to PLOF including decrease activity tolerance, decrease standing balance, decrease performance during ADL tasks, decrease cognition, decrease generalized strength, decrease performance during functional transfers, limited family support, high fall risk, and limited insight to deficits combined with medical complications of hypertension , change in mental status, and need for input for mobility technique/safety.  Personal factors affecting pt at time of initial evaluation include: step(s) to enter environment, inability to perform IADLs, inability to perform ADLs, new need for AD, inability to ambulate household distances, inability to navigate community distances, and limited insight into impairments. Pt will benefit from continued acute skilled OT services to address deficits as defined above and to maximize level independence/participation during ADLs and functional tasks to facilitate return toward PLOF and improved quality of life.     From an occupational therapy standpoint, recommendation at time of d/c would be Level II: Moderate Resource Intensity . The patient's raw score on the AM-PAC Daily Activity Inpatient Short Form is 20. A raw score of greater than or equal to 19 suggests the patient may benefit from discharge to post-acute rehabilitation services. Please refer to the recommendation of the Occupational Therapist for safe discharge planning.     Kateryna Dejesus MS, OTR/L

## 2024-02-14 NOTE — PROGRESS NOTES
Patient:    MRN:  [id number]    Elvis Request ID:  [id number]    Level of care reserved:  Home Health Agency    Partner Reserved:  [facility], [place] [id number] [phone]    Clinical needs requested:    Geography searched:  [phone]    Start of Service:    Request sent:  1:59pm EST on 2/14/2024 by Melvi Paniagua    Partner reserved:  2:57pm EST on 2/14/2024 by Melvi Paniagua    Choice list shared:  2:56pm EST on 2/14/2024 by Melvi Paniagua

## 2024-02-14 NOTE — ASSESSMENT & PLAN NOTE
Patient with 1/2 BC positive for proteus species - other BC not resulted yet   Continue ceftriaxone  Also with positive UA for UTI as possible source   Infectious disease consult placed -recommended to continue ceftriaxone and increased dose, follow up final cultures, likely 10 days antibiotics with oral cephalosporin  Trend WBC

## 2024-02-14 NOTE — SPEECH THERAPY NOTE
"Speech Language/Pathology  Speech-Language Pathology Bedside Swallow Evaluation      Patient Name: Lizette Cummings    Today's Date: 2/14/2024      Summary   Consult received for bedside swallow assessment. Pt admitted w/ MAXI, generalized weakness, frequent vomiting. PMHX includes HTN, GERD, and insomnia. Pt underwent EGD, results below. Pt currently on clear liquids per GI. Pt reported at home she would frequently vomit both liquids and solids. Denied dysphagia/odynophagia, reported globus sensation mid sternum. Reports it is mostly resolved now and asking for scrambled eggs.  Observed consuming jello and thin liquids, adequate oral management and prompt swallow response. No overt s/s aspiration. Oral/pharyngeal swallow appears grossly WFL w/ observed textures.  Education provided on reflux precautions including upright positioning for intake and 30 mins after.     Recommend to continue diet per GI, advance as tolerated per GI recommendations  Meds whole as tolerated  SLP will s/o    Risk/s for Aspiration: Low     Recommended Diet:  clears    Recommended Form of Meds: whole with liquid   Aspiration precautions and swallowing strategies: upright posture and alternating bites and sips  Other Recommendations: Continue frequent oral care        Current Medical Status    Lizette Cummings is a 63 y.o. female with a PMH of GERD, hiatal hernia, chronic cough post COVID 8/2021, HTN who presents with difficulty with oral intake. Has been increasingly weak, has chronic cough which causes abdominal muscle pain, has trouble eating anything stating she \"regurgitates\" most food. Was seen by otolaryngology in January 2023 and recommended to see GI, has not follow up. Very little oral intake at home. Presents with electrolyte abnormality and MAXI. No recent illness, has chronic cough since COVID in Aug 2021.     Current Precautions:  Fall  Aspiration     Allergies:  No known food allergies    Past medical history:  Please see " H&P for details    Special Studies:  CXR:  No acute cardiopulmonary disease.  Healing anterior right rib fractures.    EGD 2/13/24:  Z-line 35 cm from the incisors  6 cm hiatal hernia:  Hill classification: Grade IV  Mild erythematous mucosa in the antrum, consistent with gastritis; performed cold forceps biopsy to rule out H. pylori  The duodenal bulb, 1st part of the duodenum and 2nd part of the duodenum appeared normal. Performed random biopsy using biopsy forceps.  Severe, generalized grade D esophagitis with mucosal breaks measuring 5 mm or more, continuous between folds, covering 75% or more of the circumference, showing erythematous, scarred and ulcerated mucosa in the upper third of the esophagus, middle third of the esophagus, lower third of the esophagus, GE junction and Z-line; there was stigmata of recent hemorrhage  Peptic intrinsic stricture (traversable) in the esophagus (25 cm from the incisors). Multiple strictures in the esophagus. One had some resistance however I was able to traverse it. And small bleeding was noted after. Bleeding stopped spontaneously.IMPRESSION:     RECOMMENDATION:    Await pathology results     Use Carafate suspension 1gm po QID for 3 weeks  Use PPI 40 mg BID  Avoid NSAIDs  Continue GERD lifestyle & Diet modifications( liquid diet for 2 weeks)  Avoid NSAIDs  Avoid ETOH  Avoid smoking  Avoid Marijuana use  Weight management    Repeat EGD in 2 months.       Social/Education/Vocational Hx:  Pt lives with family    Swallow Information   Current Risks for Dysphagia & Aspiration: known history of dysphagia  Current Symptoms/Concerns:  vomiting  Current Diet:  clears    Baseline Diet: regular diet and thin liquids      Baseline Assessment   Behavior/Cognition: alert  Speech/Language Status: able to participate in conversation  Patient Positioning: upright in bed  Pain Status/Interventions/Response to Interventions:   No report of or nonverbal indications of pain.       Swallow  Mechanism Exam  Facial: symmetrical  Labial: WFL  Lingual: WFL  Velum: symmetrical  Mandible: adequate ROM  Dentition: adequate  Vocal quality:clear/adequate   Volitional Cough: strong/productive   Respiratory Status: on RA      Consistencies Assessed and Performance   Consistencies Administered: thin liquids and puree    Oral Stage: WFL  Mastication was adequate with the materials administered today.  Bolus formation and transfer were functional with no significant oral residue noted.  No overt s/s reduced oral control.    Pharyngeal Stage: WFL  Swallow Mechanics:  Swallowing initiation appeared prompt.  Laryngeal rise was palpated and judged to be within functional limits.  No coughing, throat clearing, change in vocal quality or respiratory status noted today.     Esophageal Concerns: none reported    Summary and Recommendations (see above)    Results Reviewed with: patient and RN     Treatment Recommended: None at this time     Hawa De Dios MS CCC-SLP  2/14/2024

## 2024-02-15 PROBLEM — E44.1 MILD PROTEIN-CALORIE MALNUTRITION (HCC): Status: ACTIVE | Noted: 2024-02-15

## 2024-02-15 LAB
ANION GAP SERPL CALCULATED.3IONS-SCNC: 8 MMOL/L
BACTERIA BLD CULT: ABNORMAL
BACTERIA UR CULT: ABNORMAL
BUN SERPL-MCNC: 17 MG/DL (ref 5–25)
CALCIUM SERPL-MCNC: 8.6 MG/DL (ref 8.4–10.2)
CHLORIDE SERPL-SCNC: 99 MMOL/L (ref 96–108)
CO2 SERPL-SCNC: 29 MMOL/L (ref 21–32)
CREAT SERPL-MCNC: 1.31 MG/DL (ref 0.6–1.3)
ERYTHROCYTE [DISTWIDTH] IN BLOOD BY AUTOMATED COUNT: 13.5 % (ref 11.6–15.1)
GFR SERPL CREATININE-BSD FRML MDRD: 43 ML/MIN/1.73SQ M
GLUCOSE SERPL-MCNC: 106 MG/DL (ref 65–140)
GRAM STN SPEC: ABNORMAL
HCT VFR BLD AUTO: 34.7 % (ref 34.8–46.1)
HGB BLD-MCNC: 11.7 G/DL (ref 11.5–15.4)
MAGNESIUM SERPL-MCNC: 1.9 MG/DL (ref 1.9–2.7)
MCH RBC QN AUTO: 38.7 PG (ref 26.8–34.3)
MCHC RBC AUTO-ENTMCNC: 33.7 G/DL (ref 31.4–37.4)
MCV RBC AUTO: 115 FL (ref 82–98)
PLATELET # BLD AUTO: 180 THOUSANDS/UL (ref 149–390)
PMV BLD AUTO: 10.3 FL (ref 8.9–12.7)
POTASSIUM SERPL-SCNC: 3.2 MMOL/L (ref 3.5–5.3)
PROTEUS SP DNA BLD POS QL NAA+NON-PROBE: DETECTED
RBC # BLD AUTO: 3.02 MILLION/UL (ref 3.81–5.12)
SODIUM SERPL-SCNC: 136 MMOL/L (ref 135–147)
WBC # BLD AUTO: 4.26 THOUSAND/UL (ref 4.31–10.16)

## 2024-02-15 PROCEDURE — 99223 1ST HOSP IP/OBS HIGH 75: CPT | Performed by: INTERNAL MEDICINE

## 2024-02-15 PROCEDURE — 97116 GAIT TRAINING THERAPY: CPT

## 2024-02-15 PROCEDURE — 80048 BASIC METABOLIC PNL TOTAL CA: CPT

## 2024-02-15 PROCEDURE — 83735 ASSAY OF MAGNESIUM: CPT

## 2024-02-15 PROCEDURE — 85027 COMPLETE CBC AUTOMATED: CPT

## 2024-02-15 PROCEDURE — 97110 THERAPEUTIC EXERCISES: CPT

## 2024-02-15 PROCEDURE — 99232 SBSQ HOSP IP/OBS MODERATE 35: CPT | Performed by: FAMILY MEDICINE

## 2024-02-15 RX ORDER — CEFAZOLIN SODIUM 2 G/50ML
2000 SOLUTION INTRAVENOUS EVERY 8 HOURS
Status: DISCONTINUED | OUTPATIENT
Start: 2024-02-16 | End: 2024-02-17 | Stop reason: HOSPADM

## 2024-02-15 RX ORDER — POTASSIUM CHLORIDE 20 MEQ/1
40 TABLET, EXTENDED RELEASE ORAL ONCE
Qty: 2 TABLET | Refills: 0 | Status: COMPLETED | OUTPATIENT
Start: 2024-02-15 | End: 2024-02-15

## 2024-02-15 RX ADMIN — SODIUM CHLORIDE, SODIUM GLUCONATE, SODIUM ACETATE, POTASSIUM CHLORIDE, MAGNESIUM CHLORIDE, SODIUM PHOSPHATE, DIBASIC, AND POTASSIUM PHOSPHATE 75 ML/HR: .53; .5; .37; .037; .03; .012; .00082 INJECTION, SOLUTION INTRAVENOUS at 10:42

## 2024-02-15 RX ADMIN — METOPROLOL SUCCINATE 50 MG: 50 TABLET, EXTENDED RELEASE ORAL at 08:41

## 2024-02-15 RX ADMIN — SUCRALFATE 1 G: 1 TABLET ORAL at 11:44

## 2024-02-15 RX ADMIN — Medication 1000 UNITS: at 08:38

## 2024-02-15 RX ADMIN — PANTOPRAZOLE SODIUM 40 MG: 40 TABLET, DELAYED RELEASE ORAL at 08:38

## 2024-02-15 RX ADMIN — POTASSIUM CHLORIDE 40 MEQ: 1500 TABLET, EXTENDED RELEASE ORAL at 08:38

## 2024-02-15 RX ADMIN — CLONAZEPAM 0.5 MG: 0.5 TABLET ORAL at 20:47

## 2024-02-15 RX ADMIN — CEFTRIAXONE 2000 MG: 2 INJECTION, SOLUTION INTRAVENOUS at 08:50

## 2024-02-15 RX ADMIN — SUCRALFATE 1 G: 1 TABLET ORAL at 20:46

## 2024-02-15 RX ADMIN — SUCRALFATE 1 G: 1 TABLET ORAL at 16:53

## 2024-02-15 RX ADMIN — SUCRALFATE 1 G: 1 TABLET ORAL at 08:39

## 2024-02-15 RX ADMIN — PANTOPRAZOLE SODIUM 40 MG: 40 TABLET, DELAYED RELEASE ORAL at 16:53

## 2024-02-15 RX ADMIN — BUPROPION HYDROCHLORIDE 300 MG: 150 TABLET, EXTENDED RELEASE ORAL at 08:38

## 2024-02-15 NOTE — PHYSICAL THERAPY NOTE
"   PHYSICAL THERAPY TREATMENT NOTE  NAME:  Lizette Cummings  DATE: 02/15/24    Length Of Stay: 3  Performed at least 2 patient identifiers during session: Name and Birthday  Treatment time: 919-942  Treatment length: 23 min       02/15/24 0919   Note Type   Note Type Treatment   Pain Assessment   Pain Assessment Tool 0-10   Pain Score No Pain   Restrictions/Precautions   Other Precautions Chair Alarm;Bed Alarm;Multiple lines;Fall Risk   General   Chart Reviewed Yes   Response to Previous Treatment Patient with no complaints from previous session.   Cognition   Overall Cognitive Status WFL   Orientation Level Oriented X4   Following Commands Follows one step commands without difficulty   Subjective   Subjective \"I'm really tired today\"   Bed Mobility   Supine to Sit 5  Supervision   Additional items HOB elevated;Bedrails;Increased time required;Verbal cues   Additional Comments HOB elevated,bedrails and supervision with VC for safety   Transfers   Sit to Stand   (SBA)   Additional items Increased time required;Verbal cues   Stand to Sit   (SBA)   Additional items Increased time required;Verbal cues   Stand pivot   (SBA)   Additional items Increased time required;Verbal cues   Additional Comments With RW, VC for hand placement as pt attempting to pull from RW   Ambulation/Elevation   Gait pattern Improper Weight shift;Decreased foot clearance;Decreased L stance;Inconsistent gabriele;Foward flexed;Short stride;Excessively slow   Gait Assistance   (SBA)   Additional items Verbal cues   Assistive Device Rolling walker   Distance 80' with RW and SBA with VC for increased step length and height   Stair Management Assistance   (SBA)   Additional items Verbal cues   Stair Management Technique Step to pattern;Foreward;Backward;With walker   Number of Stairs 5   Balance   Static Sitting Normal   Dynamic Sitting Good   Static Standing Fair +   Dynamic Standing Fair   Ambulatory Fair -   Endurance Deficit   Endurance Deficit " Yes   Endurance Deficit Description fatigue   Activity Tolerance   Activity Tolerance Patient limited by fatigue   Nurse Made Aware Rosa DUMONT   Exercises   Hip Flexion Sitting;20 reps;AROM;Bilateral   Knee AROM Long Arc Quad Sitting;20 reps;AROM;Bilateral   Assessment   Prognosis Good   Problem List Decreased strength;Decreased endurance;Impaired balance;Decreased mobility   Barriers to Discharge None   Barriers to Discharge Comments Pt has assistance from spouse   Goals   PT Treatment Day 2   Plan   Treatment/Interventions Functional transfer training;LE strengthening/ROM;Elevations;Therapeutic exercise;Endurance training;Patient/family training;Equipment eval/education;Bed mobility;Gait training;Compensatory technique education;Spoke to nursing   Progress Slow progress, decreased activity tolerance   PT Frequency 3-5x/wk   Discharge Recommendation   Rehab Resource Intensity Level, PT III (Minimum Resource Intensity)   Equipment Recommended Walker   Walker Package Recommended Wheeled walker   Change/add to Walker Package? No   AM-PAC Basic Mobility Inpatient   Turning in Flat Bed Without Bedrails 4   Lying on Back to Sitting on Edge of Flat Bed Without Bedrails 3   Moving Bed to Chair 3   Standing Up From Chair Using Arms 3   Walk in Room 3   Climb 3-5 Stairs With Railing 3   Basic Mobility Inpatient Raw Score 19   Basic Mobility Standardized Score 42.48   Highest Level Of Mobility   JH-HLM Goal 6: Walk 10 steps or more   JH-HLM Achieved 7: Walk 25 feet or more   Education   Education Provided Mobility training;Home exercise program;Assistive device   Patient Demonstrates acceptance/verbal understanding   End of Consult   Patient Position at End of Consult Supine;Bed/Chair alarm activated;All needs within reach   End of Consult Comments pt declining to sit OOB in recliner     The patient's AM-PAC Basic Mobility Inpatient Short Form Raw Score is 19. A Raw score of greater than 16 suggests the patient may benefit from  discharge to home. Please also refer to the recommendation of the Physical Therapist for safe discharge planning.    Assessment: Pt seen for PT treatment session this date with interventions consisting of gait training w/ emphasis on improving pt's ability to ambulate level surfaces x 80' with SBA provided by therapist with RW, Therapeutic exercise consisting of: AROM 20 reps B LE in sitting position, and navigating 5 stairs w/ B/L handrail with step to pattern with SBA. Pt agreeable to PT treatment session upon arrival, pt found supine in bed w/ HOB elevated, in no apparent distress. In comparison to previous session, pt with improvements in pt able to ambulate increased distance this session and complete limited therex . Post session: pt returned BTB, bed alarm engaged, all needs in reach, and RN notified of session findings/recommendations Continue to recommend  Level III Resource Intensity  at time of d/c in order to maximize pt's functional independence and safety w/ mobility. Pt continues to be functioning below baseline level, and remains limited 2* factors listed above and including decreased strength, balance, mobility, and activity tolerance. PT will continue to see pt while here in order to address the deficits listed above and provide interventions consistent w/ POC in effort to achieve STGs.     Tonia Forbes, PT,DPT

## 2024-02-15 NOTE — PLAN OF CARE
Problem: PHYSICAL THERAPY ADULT  Goal: Performs mobility at highest level of function for planned discharge setting.  See evaluation for individualized goals.  Description: Treatment/Interventions: Functional transfer training, ADL retraining, LE strengthening/ROM, Elevations, Therapeutic exercise, Endurance training, Patient/family training, Equipment eval/education, Bed mobility, Compensatory technique education, Gait training, Spoke to nursing  Equipment Recommended: Walker       See flowsheet documentation for full assessment, interventions and recommendations.  2/15/2024 1224 by Tonia Forbes PT  Outcome: Progressing  Note: Prognosis: Good  Problem List: Decreased strength, Decreased endurance, Impaired balance, Decreased mobility  Assessment: Pt seen for PT treatment session this date with interventions consisting of gait training w/ emphasis on improving pt's ability to ambulate level surfaces x 80' with SBA provided by therapist with RW, Therapeutic exercise consisting of: AROM 20 reps B LE in sitting position, and navigating 5 stairs w/ B/L handrail with step to pattern with SBA. Pt agreeable to PT treatment session upon arrival, pt found supine in bed w/ HOB elevated, in no apparent distress. In comparison to previous session, pt with improvements in pt able to ambulate increased distance this session and complete limited therex . Post session: pt returned BTB, bed alarm engaged, all needs in reach, and RN notified of session findings/recommendations Continue to recommend  Level III Resource Intensity  at time of d/c in order to maximize pt's functional independence and safety w/ mobility. Pt continues to be functioning below baseline level, and remains limited 2* factors listed above and including decreased strength, balance, mobility, and activity tolerance. PT will continue to see pt while here in order to address the deficits listed above and provide interventions consistent w/ POC in effort to  achieve STGs.  Barriers to Discharge: None  Barriers to Discharge Comments: Pt has assistance from spouse  Rehab Resource Intensity Level, PT: III (Minimum Resource Intensity)    See flowsheet documentation for full assessment.

## 2024-02-15 NOTE — CASE MANAGEMENT
Case Management Discharge Planning Note    Patient name Lizette Cummings  Location /-01 MRN [id number]  : 1960 Date 2/15/2024       Current Admission Date: 2024  Current Admission Diagnosis:MAXI (acute kidney injury) ([facility])   Patient Active Problem List    Diagnosis Date Noted    Mild protein-calorie malnutrition (HCC) 02/15/2024    Bacteremia 2024    Acute cystitis with hematuria 2024    MAXI (acute kidney injury) ([facility]) 2024    Hypertension 2024    Generalized weakness 2024    GERD (gastroesophageal reflux disease) 2024    Insomnia 2024    Electrolyte disturbance 2024      LOS (days): 3  Geometric Mean LOS (GMLOS) (days): 3.1  Days to GMLOS:0.2     OBJECTIVE:  Risk of Unplanned Readmission Score: 11.05         Current admission status: Inpatient   Preferred Pharmacy:   [facility] [address] [facility] [address] [facility]  [address] [facility] [address]  Phone: [phone] Fax: [phone]    [id number] - [place], [place] - [address]  [address] [address]  Phone: [phone] Fax: [phone]    Primary Care Provider: Susan Kelly DO    Primary Insurance: MEDICARE  Secondary Insurance: [facility]    DISCHARGE DETAILS:    Not medically ready for dc, per rounds anticipate dc in 1-2 days.    Discharge planning discussed with:: patient  Freedom of Choice: Yes  Comments - Freedom of Choice: Spoke to patient she is aware [facility] is set up for her. BSC has been delivered to the bedside, pt has a walker at home for home use. Pt is up ambulating in the [place]ott with therapy,  CM contacted family/caregiver?: No- see comments (declined)             Treatment Team Recommendation: Home with Home Health Care  Discharge Destination Plan:: Home with Home Health Care ([facility] is set up for dc)                  IMM Given (Date):: 02/15/24  IMM Given to:: Patient              ORLIN  will continue to follow for dc needs.

## 2024-02-15 NOTE — PLAN OF CARE
Problem: Potential for Falls  Goal: Patient will remain free of falls  Description: INTERVENTIONS:  - Educate patient/family on patient safety including physical limitations  - Instruct patient to call for assistance with activity   - Consult OT/PT to assist with strengthening/mobility   - Keep Call bell within reach  - Keep bed low and locked with side rails adjusted as appropriate  - Keep care items and personal belongings within reach  - Initiate and maintain comfort rounds  - Make Fall Risk Sign visible to staff  - Offer Toileting every 3 Hours, in advance of need  - Initiate/Maintain   alarm  - Obtain necessary fall risk management equipment:       - Apply yellow socks and bracelet for high fall risk patients  - Consider moving patient to room near nurses station  Outcome: Progressing     Problem: Nutrition/Hydration-ADULT  Goal: Nutrient/Hydration intake appropriate for improving, restoring or maintaining nutritional needs  Description: Monitor and assess patient's nutrition/hydration status for malnutrition. Collaborate with interdisciplinary team and initiate plan and interventions as ordered.  Monitor patient's weight and dietary intake as ordered or per policy. Utilize nutrition screening tool and intervene as necessary. Determine patient's food preferences and provide high-protein, high-caloric foods as appropriate.     INTERVENTIONS:  - Monitor oral intake, urinary output, labs, and treatment plans  - Assess nutrition and hydration status and recommend course of action  - Evaluate amount of meals eaten  - Assist patient with eating if necessary   - Allow adequate time for meals  - Recommend/ encourage appropriate diets, oral nutritional supplements, and vitamin/mineral supplements  - Order, calculate, and assess calorie counts as needed  - Recommend, monitor, and adjust tube feedings and TPN/PPN based on assessed needs  - Assess need for intravenous fluids  - Provide specific nutrition/hydration  education as appropriate  - Include patient/family/caregiver in decisions related to nutrition  Outcome: Progressing     Problem: GASTROINTESTINAL - ADULT  Goal: Minimal or absence of nausea and/or vomiting  Description: INTERVENTIONS:  - Administer IV fluids if ordered to ensure adequate hydration  - Maintain NPO status until nausea and vomiting are resolved  - Nasogastric tube if ordered  - Administer ordered antiemetic medications as needed  - Provide nonpharmacologic comfort measures as appropriate  - Advance diet as tolerated, if ordered  - Consider nutrition services referral to assist patient with adequate nutrition and appropriate food choices  Outcome: Progressing  Goal: Maintains or returns to baseline bowel function  Description: INTERVENTIONS:  - Assess bowel function  - Encourage oral fluids to ensure adequate hydration  - Administer IV fluids if ordered to ensure adequate hydration  - Administer ordered medications as needed  - Encourage mobilization and activity  - Consider nutritional services referral to assist patient with adequate nutrition and appropriate food choices  Outcome: Progressing  Goal: Maintains adequate nutritional intake  Description: INTERVENTIONS:  - Monitor percentage of each meal consumed  - Identify factors contributing to decreased intake, treat as appropriate  - Assist with meals as needed  - Monitor I&O, weight, and lab values if indicated  - Obtain nutrition services referral as needed  Outcome: Progressing  Goal: Oral mucous membranes remain intact  Description: INTERVENTIONS  - Assess oral mucosa and hygiene practices  - Implement preventative oral hygiene regimen  - Implement oral medicated treatments as ordered  - Initiate Nutrition services referral as needed  Outcome: Progressing

## 2024-02-15 NOTE — ASSESSMENT & PLAN NOTE
Both K and Mg low on admission   Replace kcl  Continue to trend - improving     Lab Results   Component Value Date    K 3.2 (L) 02/15/2024    K 3.1 (L) 02/14/2024    MG 1.9 02/15/2024    MG 2.1 02/14/2024        Onset: past few days  Location / description:urinary  Precipitating Factors: na  Pain Scale (1 - 10), 10 highest: na  Associated Symptoms: dysuria, frequency, urgency.   What improves/worsens symptoms:Azo  Symptom specific medications: na  LMP : No LMP recorded. Patient is postmenopausal.  Are you pregnant or breast feeding: na  Recent Care: LOV 11/4/21    Allergies verified. Rx sent to pts preferred pharmacy per protocol. Transferred to PSR to schedule annual, pt is medicare.

## 2024-02-15 NOTE — ASSESSMENT & PLAN NOTE
Creatine on admission 2.16, Baseline creatinine normal (0.8)  Etiology poor oral intake secondary to severe reflux and nausea  Imaging CT abdomen pelvis unrevealing for acute pathology, moderate hiatal hernia    Lab Results   Component Value Date    CREATININE 1.31 (H) 02/15/2024    CREATININE 1.45 (H) 02/14/2024     Hold spironolactone  Avoid hypotension, nephrotoxins, and NSAIDS if possible    Urine studies orders - calculate FeNA - prerenal   IV fluids - improving, continue IVF  Strict I & Os  Treat underlying UTI   Improving

## 2024-02-15 NOTE — ASSESSMENT & PLAN NOTE
Patient has longstanding history of GERD with moderate-sized hiatal hernia  She takes Protonix 40 mg twice daily  Has seen otolaryngology in the past for chronic cough and was recommended to see GI, has not followed up  Last EGD around 20 to 30 years ago    Consult GI  EGD 2/13 with severe esophagitis, biopsy sent  Advanced to solid

## 2024-02-15 NOTE — ASSESSMENT & PLAN NOTE
Malnutrition Findings:   Adult Malnutrition type: Chronic illness  Adult Degree of Malnutrition: Malnutrition of mild degree  Malnutrition Characteristics: Inadequate energy, Weight loss                  360 Statement: malnutrition of mild degree in setting of chronic illness, evidenced by wt loss of 40 lbs/PT over the past year due to inability to tolerate po intake, poor po inake for > 1 month, treated with diet and supplements    BMI Findings:           Body mass index is 22.71 kg/m².

## 2024-02-15 NOTE — PROGRESS NOTES
Progress Note - Infectious Disease   Lizette Cummings 63 y.o. female MRN: [id number]  Unit/Bed#: -01 Encounter: [id number]        REQUIRED DOCUMENTATION:     1. This service was provided via Telemedicine.  2. Provider located at [facility].  3. TeleMed provider: Diamond Tucker MD.  4. Identify all parties in room with patient during tele consult:RN  5. After connecting through [employer], patient was identified by name and date of birth and assistant checked wristband.  Patient was then informed that this was a Telemedicine visit and that the exam was being conducted confidentially over secure lines. My office door was closed. No one else was in the room.  Patient acknowledged consent and understanding of privacy and security of the Telemedicine visit, and gave us permission to have the assistant stay in the room in order to assist with the history and to conduct the exam.  I informed the patient that I have reviewed their record in Epic and presented the opportunity for them to ask any questions regarding the visit today.  The patient agreed to participate.       Assessment/Recommendations     Proteus bacteremia  Possible complicated UTI  Chronic sinusitis  Severe esophagitis  Acute kidney injury - improving     -Patient presenting with several weeks of generalized weakness, upper respiratory and upper GI symptoms.  She is without evidence of sepsis but noted to have positive blood cultures for Proteus.  Suspect source of bacteremia is UTI in the setting of pyuria and new hematuria but patient has no other localizing symptoms or obstructive uropathy. Urine cx has grown multiple spp inc Proteus, Klebsiella and E.coli. GI translocation is less likely.  No pneumonia on chest imaging, no open wounds.  -Afebrile with mild leukopenia     Discontinue ceftriaxone and start cefazolin 2 q8h  On discharge patient can be transitioned to PO keflex 500 qid to complete 10 days of therapy through 2/22  Continue  supportive care, monitor clinical symptoms.  If hematuria persists or worsens would recommend inpatient urology evaluation otherwise would recommend outpatient urology follow up  Recommend outpatient ENT and GI follow up    Discussed above plan in detail with the primary service who is in agreement. Will peripherally follow  Please call with concerns or any changes in clinical status or new test results.      History       Subjective:  The patient has no complaints. Continues with dry cough, hematuria has resolved. Tolerating soup this morning.   Denies fevers, chills, or sweats.  Denies nausea, vomiting, or diarrhea.    Antibiotics:  ceftriaxone      Physical Exam     Temp:  [97.9 °F (36.6 °C)-98.2 °F (36.8 °C)] 97.9 °F (36.6 °C)  HR:  [] 94  Resp:  [17-18] 18  BP: (101-120)/(62-79) 120/79  SpO2:  [95 %-98 %] 97 %  Temp (24hrs), Av °F (36.7 °C), Min:97.9 °F (36.6 °C), Max:98.2 °F (36.8 °C)  Current: Temperature: 97.9 °F (36.6 °C)    Intake/Output Summary (Last 24 hours) at 2/15/2024 1055  Last data filed at 2/15/2024 0728  Gross per 24 hour   Intake 3556.25 ml   Output 975 ml   Net 2581.25 ml       Physical exam findings reported by bedside and primary medical team staff      General Appearance:  Appearing fatigued, nontoxic, and in no distress, appears stated age   Lungs:   Clear to auscultation bilaterally, no audible wheezes, rhonchi and rales, respirations unlabored   Heart:  Regular rate and rhythm, S1, S2 normal, no murmur, rub or gallop   Abdomen:   Soft, non-tender, non-distended, positive bowel sounds, no masses, no organomegaly    No CVA tenderness   Extremities: Extremities normal, atraumatic, no cyanosis, clubbing or edema   Skin: Skin color, texture, turgor normal, no rashes or lesions. No draining wounds noted.   Neurologic: Alert and oriented times 3,         Invasive Devices:   Peripheral IV 24 Left Antecubital (Active)   Site Assessment WDL 24 2300   Dressing Type  Transparent 02/14/24 2300   Line Status Infusing 02/14/24 2300   Dressing Status Clean;Dry;Intact 02/14/24 2300   Dressing Change Due 02/16/24 02/14/24 2300   Reason Not Rotated Not due 02/14/24 2300       External Urinary Catheter (Active)       Labs, Imaging, & Other Studies     Lab Results:    I have personally reviewed pertinent labs.    Results from last 7 days   Lab Units 02/15/24  0443 02/14/24  0519 02/13/24 0436   WBC Thousand/uL 4.26* 3.79* 3.52*   HEMOGLOBIN g/dL 11.7 10.6* 9.9*   PLATELETS Thousands/uL 180 147* 141*     Results from last 7 days   Lab Units 02/15/24  0443 02/13/24  1945 02/13/24  0436 02/12/24  1438   POTASSIUM mmol/L 3.2*   < > 3.4* 3.2*   CHLORIDE mmol/L 99   < > 97 91*   CO2 mmol/L 29   < > 25 22   BUN mg/dL 17   < > 27* 28*   CREATININE mg/dL 1.31*   < > 1.70* 2.16*   EGFR ml/min/1.73sq m 43   < > 31 23   CALCIUM mg/dL 8.6   < > 8.7 9.5   AST U/L  --   --  74* 96*   ALT U/L  --   --  35 49   ALK PHOS U/L  --   --  89 102    < > = values in this interval not displayed.     Results from last 7 days   Lab Units 02/14/24  1435 02/13/24  0101 02/12/24  1438   BLOOD CULTURE  Received in Microbiology Lab. Culture in Progress.  Received in Microbiology Lab. Culture in Progress.  --  Proteus mirabilis*   GRAM STAIN RESULT   --   --  Gram negative rods*  Gram negative rods*   URINE CULTURE   --  Culture results to follow.  --        Imaging Studies:   I have personally reviewed pertinent imaging study reports and images in PACS.      EKG, Pathology, and Other Studies:   I have personally reviewed pertinent reports.        Counseling/Coordination of care:       Total 50 minutes communication with the patient via telehealth.  Labs, medical tests and imaging studies were independently reviewed by me as noted above.

## 2024-02-15 NOTE — PLAN OF CARE
Problem: PHYSICAL THERAPY ADULT  Goal: Performs mobility at highest level of function for planned discharge setting.  See evaluation for individualized goals.  Description: Treatment/Interventions: Functional transfer training, ADL retraining, LE strengthening/ROM, Elevations, Therapeutic exercise, Endurance training, Patient/family training, Equipment eval/education, Bed mobility, Compensatory technique education, Gait training, Spoke to nursing  Equipment Recommended: Walker       See flowsheet documentation for full assessment, interventions and recommendations.  Outcome: Progressing  Note: Prognosis: Good  Problem List: Decreased strength, Decreased endurance, Impaired balance, Decreased mobility  Assessment: Pt seen for PT treatment session this date with interventions consisting of gait training w/ emphasis on improving pt's ability to ambulate level surfaces x 80' with SBA provided by therapist with RW, Therapeutic exercise consisting of: AROM 20 reps B LE in sitting position, and navigating 5 stairs w/ B/L handrail with step to pattern with SBA. Pt agreeable to PT treatment session upon arrival, pt found supine in bed w/ HOB elevated, in no apparent distress. In comparison to previous session, pt with improvements in pt able to ambulate increased distance this session and complete limited therex . Post session: pt returned BTB, bed alarm engaged, all needs in reach, and RN notified of session findings/recommendations Continue to recommend  Level III Resource Intensity  at time of d/c in order to maximize pt's functional independence and safety w/ mobility. Pt continues to be functioning below baseline level, and remains limited 2* factors listed above and including decreased strength, balance, mobility, and activity tolerance. PT will continue to see pt while here in order to address the deficits listed above and provide interventions consistent w/ POC in effort to achieve STGs.  Barriers to Discharge:  None  Barriers to Discharge Comments: Pt has assistance from spouse  Rehab Resource Intensity Level, PT: III (Minimum Resource Intensity)    See flowsheet documentation for full assessment.

## 2024-02-15 NOTE — PROGRESS NOTES
[facility]  Progress Note  Name: Lizette Cummings I  MRN: [id number]  Unit/Bed#: -01 I Date of Admission: 2/12/2024   Date of Service: 2/15/2024 I Hospital Day: 3    Assessment/Plan   * MAXI (acute kidney injury) (HCC)  Assessment & Plan  Creatine on admission 2.16, Baseline creatinine normal (0.8)  Etiology poor oral intake secondary to severe reflux and nausea  Imaging CT abdomen pelvis unrevealing for acute pathology, moderate hiatal hernia    Lab Results   Component Value Date    CREATININE 1.31 (H) 02/15/2024    CREATININE 1.45 (H) 02/14/2024     Hold spironolactone  Avoid hypotension, nephrotoxins, and NSAIDS if possible    Urine studies orders - calculate FeNA - prerenal   IV fluids - improving, continue IVF  Strict I & Os  Treat underlying UTI   Improving        Mild protein-calorie malnutrition (HCC)  Assessment & Plan  Malnutrition Findings:   Adult Malnutrition type: Chronic illness  Adult Degree of Malnutrition: Malnutrition of mild degree  Malnutrition Characteristics: Inadequate energy, Weight loss                  360 Statement: malnutrition of mild degree in setting of chronic illness, evidenced by wt loss of 40 lbs/PT over the past year due to inability to tolerate po intake, poor po inake for > 1 month, treated with diet and supplements    BMI Findings:           Body mass index is 22.71 kg/m².       Acute cystitis with hematuria  Assessment & Plan  Patient without symptoms of UTI however positive UA and reflex to culture with Positive BC   Start ceftriaxone   Trend WBC  Patient started with hematuria 2/14, DVT prophylaxis held and if persisting would consult urology  Urinary retention protocol    Bacteremia  Assessment & Plan  Patient with 1/2 BC positive for proteus species - other BC not resulted yet   Continue ceftriaxone  Also with positive UA for UTI as possible source   Infectious disease consult placed -recommended to continue ceftriaxone and increased  dose, follow up final cultures, likely 10 days antibiotics with oral cephalosporin  Trend WBC    Electrolyte disturbance  Assessment & Plan  Both K and Mg low on admission   Replace kcl  Continue to trend - improving     Lab Results   Component Value Date    K 3.2 (L) 02/15/2024    K 3.1 (L) 02/14/2024    MG 1.9 02/15/2024    MG 2.1 02/14/2024         Insomnia  Assessment & Plan  Patient reports insomnia   Usually takes klonopin - PDMP reviewed. Prescribed by PCP reports not taking every night   Will order PRN     GERD (gastroesophageal reflux disease)  Assessment & Plan  Patient has longstanding history of GERD with moderate-sized hiatal hernia  She takes Protonix 40 mg twice daily  Has seen otolaryngology in the past for chronic cough and was recommended to see GI, has not followed up  Last EGD around 20 to 30 years ago    Consult GI  EGD 2/13 with severe esophagitis, biopsy sent  Advanced to solid     Generalized weakness  Assessment & Plan  Patient endorses some generalized weakness and fatigue likely secondary to poor oral intake  PT/OT consults    Hypertension  Assessment & Plan  Continue metoprolol (decreased dose to avoid hypotension) hold spironolactone                  VTE Pharmacologic Prophylaxis:   Moderate Risk (Score 3-4) - Pharmacological DVT Prophylaxis Contraindicated. Sequential Compression Devices Ordered.stooped fue to hematuria    Mobility:   Basic Mobility Inpatient Raw Score: 18  JH-HLM Goal: 6: Walk 10 steps or more  JH-HLM Achieved: 6: Walk 10 steps or more  HLM Goal achieved. Continue to encourage appropriate mobility.    Patient Centered Rounds: I performed bedside rounds with nursing staff today.   Discussions with Specialists or Other Care Team Provider: none    Education and Discussions with Family / Patient: pt    Total Time Spent on Date of Encounter in care of patient: >35 mins. This time was spent on one or more of the following: performing physical exam; counseling and  coordination of care; obtaining or reviewing history; documenting in the medical record; reviewing/ordering tests, medications or procedures; communicating with other healthcare professionals and discussing with patient's family/caregivers.    Current Length of Stay: 3 day(s)  Current Patient Status: Inpatient   Certification Statement: The patient will continue to require additional inpatient hospital stay due to poor po intake   Discharge Plan: Anticipate discharge in 48 hrs to home.    Code Status: Level 1 - Full Code    Subjective:   Seen and examined no dysphagia but still poor po inatke    Objective:     Vitals:   Temp (24hrs), Av °F (36.7 °C), Min:97.9 °F (36.6 °C), Max:98.2 °F (36.8 °C)    Temp:  [97.9 °F (36.6 °C)-98.2 °F (36.8 °C)] 97.9 °F (36.6 °C)  HR:  [] 94  Resp:  [17-18] 18  BP: (101-120)/(62-79) 120/79  SpO2:  [95 %-98 %] 97 %  Body mass index is 22.71 kg/m².     Input and Output Summary (last 24 hours):     Intake/Output Summary (Last 24 hours) at 2/15/2024 1209  Last data filed at 2/15/2024 0728  Gross per 24 hour   Intake 3556.25 ml   Output 975 ml   Net 2581.25 ml       Physical Exam:   Physical Exam  Vitals and nursing note reviewed.   Constitutional:       General: She is not in acute distress.     Appearance: She is well-developed.   HENT:      Head: Normocephalic and atraumatic.   Eyes:      Conjunctiva/sclera: Conjunctivae normal.   Cardiovascular:      Rate and Rhythm: Normal rate and regular rhythm.      Heart sounds: No murmur heard.  Pulmonary:      Effort: Pulmonary effort is normal. No respiratory distress.      Breath sounds: Normal breath sounds. No wheezing or rales.   Abdominal:      General: Bowel sounds are normal. There is no distension.      Palpations: Abdomen is soft.      Tenderness: There is no abdominal tenderness.   Musculoskeletal:         General: No swelling.      Cervical back: Neck supple.   Skin:     General: Skin is warm and dry.      Capillary Refill:  Capillary refill takes less than 2 seconds.   Neurological:      Mental Status: She is alert and oriented to person, place, and time.   Psychiatric:         Mood and Affect: Mood normal.          Additional Data:     Labs:  Results from last 7 days   Lab Units 02/15/24  0443 02/13/24  0436 02/12/24  1438   WBC Thousand/uL 4.26*   < > 6.96   HEMOGLOBIN g/dL 11.7   < > 11.8   HEMATOCRIT % 34.7*   < > 33.7*   PLATELETS Thousands/uL 180   < > 172   NEUTROS PCT %  --   --  71   LYMPHS PCT %  --   --  10*   MONOS PCT %  --   --  19*   EOS PCT %  --   --  0    < > = values in this interval not displayed.     Results from last 7 days   Lab Units 02/15/24  0443 02/13/24  1945 02/13/24  0436   SODIUM mmol/L 136   < > 131*   POTASSIUM mmol/L 3.2*   < > 3.4*   CHLORIDE mmol/L 99   < > 97   CO2 mmol/L 29   < > 25   BUN mg/dL 17   < > 27*   CREATININE mg/dL 1.31*   < > 1.70*   ANION GAP mmol/L 8   < > 9   CALCIUM mg/dL 8.6   < > 8.7   ALBUMIN g/dL  --   --  2.9*   TOTAL BILIRUBIN mg/dL  --   --  1.73*   ALK PHOS U/L  --   --  89   ALT U/L  --   --  35   AST U/L  --   --  74*   GLUCOSE RANDOM mg/dL 106   < > 126    < > = values in this interval not displayed.                 Results from last 7 days   Lab Units 02/12/24  1438   LACTIC ACID mmol/L 1.1       Lines/Drains:  Invasive Devices       Peripheral Intravenous Line  Duration             Peripheral IV 02/12/24 Left Antecubital 2 days              Drain  Duration             External Urinary Catheter 2 days                          Imaging: Reviewed radiology reports from this admission including: abdominal/pelvic CT    Recent Cultures (last 7 days):   Results from last 7 days   Lab Units 02/14/24  1435 02/13/24  0101 02/12/24  1438   BLOOD CULTURE  Received in Microbiology Lab. Culture in Progress.  Received in Microbiology Lab. Culture in Progress.  --  Proteus mirabilis*   GRAM STAIN RESULT   --   --  Gram negative rods*  Gram negative rods*   URINE CULTURE   --  Culture  results to follow.  --        Last 24 Hours Medication List:   Current Facility-Administered Medications   Medication Dose Route Frequency Provider Last Rate    acetaminophen  650 mg Oral Q6H PRN Ana Maria Morillo PA-C      albuterol  2.5 mg Nebulization Q6H PRN Ana Maria Morillo PA-C      aluminum-magnesium hydroxide-simethicone  30 mL Oral Q6H PRN Ana Maria Morillo PA-C      buPROPion  300 mg Oral Daily Ana Maria Morillo PA-C      cefTRIAXone  2,000 mg Intravenous Q24H Diamond Tucker MD 2,000 mg (02/15/24 0850)    cholecalciferol  1,000 Units Oral Daily Ana Maria Morillo PA-C      clonazePAM  0.5 mg Oral HS PRN nAa Maria Morillo PA-C      metoprolol succinate  50 mg Oral BID Ana Maria Morillo PA-C      multi-electrolyte  75 mL/hr Intravenous Continuous Ana Maria Morillo PA-C 75 mL/hr (02/15/24 1042)    pantoprazole  40 mg Oral BID AC Ana Maria Morillo PA-C      sucralfate  1 g Oral 4x Daily (AC & HS) Ana Maria Morillo PA-C          Today, Patient Was Seen By: Yudith Grant MD    **Please Note: This note may have been constructed using a voice recognition system.**

## 2024-02-16 LAB
ANION GAP SERPL CALCULATED.3IONS-SCNC: 6 MMOL/L
BACTERIA BLD CULT: ABNORMAL
BASOPHILS # BLD AUTO: 0.01 THOUSANDS/ÂΜL (ref 0–0.1)
BASOPHILS NFR BLD AUTO: 0 % (ref 0–1)
BUN SERPL-MCNC: 11 MG/DL (ref 5–25)
CALCIUM SERPL-MCNC: 8 MG/DL (ref 8.4–10.2)
CHLORIDE SERPL-SCNC: 102 MMOL/L (ref 96–108)
CO2 SERPL-SCNC: 29 MMOL/L (ref 21–32)
CREAT SERPL-MCNC: 1.02 MG/DL (ref 0.6–1.3)
EOSINOPHIL # BLD AUTO: 0.02 THOUSAND/ÂΜL (ref 0–0.61)
EOSINOPHIL NFR BLD AUTO: 0 % (ref 0–6)
ERYTHROCYTE [DISTWIDTH] IN BLOOD BY AUTOMATED COUNT: 13.6 % (ref 11.6–15.1)
GFR SERPL CREATININE-BSD FRML MDRD: 59 ML/MIN/1.73SQ M
GLUCOSE SERPL-MCNC: 104 MG/DL (ref 65–140)
GRAM STN SPEC: ABNORMAL
HCT VFR BLD AUTO: 29.1 % (ref 34.8–46.1)
HGB BLD-MCNC: 9.8 G/DL (ref 11.5–15.4)
IMM GRANULOCYTES # BLD AUTO: 0.01 THOUSAND/UL (ref 0–0.2)
IMM GRANULOCYTES NFR BLD AUTO: 0 % (ref 0–2)
LYMPHOCYTES # BLD AUTO: 1.56 THOUSANDS/ÂΜL (ref 0.6–4.47)
LYMPHOCYTES NFR BLD AUTO: 31 % (ref 14–44)
MCH RBC QN AUTO: 38.3 PG (ref 26.8–34.3)
MCHC RBC AUTO-ENTMCNC: 33.7 G/DL (ref 31.4–37.4)
MCV RBC AUTO: 114 FL (ref 82–98)
MONOCYTES # BLD AUTO: 0.86 THOUSAND/ÂΜL (ref 0.17–1.22)
MONOCYTES NFR BLD AUTO: 17 % (ref 4–12)
NEUTROPHILS # BLD AUTO: 2.55 THOUSANDS/ÂΜL (ref 1.85–7.62)
NEUTS SEG NFR BLD AUTO: 52 % (ref 43–75)
NRBC BLD AUTO-RTO: 0 /100 WBCS
PLATELET # BLD AUTO: 171 THOUSANDS/UL (ref 149–390)
PMV BLD AUTO: 10.3 FL (ref 8.9–12.7)
POTASSIUM SERPL-SCNC: 3.4 MMOL/L (ref 3.5–5.3)
RBC # BLD AUTO: 2.56 MILLION/UL (ref 3.81–5.12)
SODIUM SERPL-SCNC: 137 MMOL/L (ref 135–147)
WBC # BLD AUTO: 5.01 THOUSAND/UL (ref 4.31–10.16)

## 2024-02-16 PROCEDURE — 88342 IMHCHEM/IMCYTCHM 1ST ANTB: CPT | Performed by: PATHOLOGY

## 2024-02-16 PROCEDURE — 88341 IMHCHEM/IMCYTCHM EA ADD ANTB: CPT | Performed by: PATHOLOGY

## 2024-02-16 PROCEDURE — 99232 SBSQ HOSP IP/OBS MODERATE 35: CPT | Performed by: INTERNAL MEDICINE

## 2024-02-16 PROCEDURE — 85025 COMPLETE CBC W/AUTO DIFF WBC: CPT | Performed by: FAMILY MEDICINE

## 2024-02-16 PROCEDURE — 88305 TISSUE EXAM BY PATHOLOGIST: CPT | Performed by: PATHOLOGY

## 2024-02-16 PROCEDURE — 80048 BASIC METABOLIC PNL TOTAL CA: CPT | Performed by: FAMILY MEDICINE

## 2024-02-16 RX ORDER — POTASSIUM CHLORIDE 20 MEQ/1
40 TABLET, EXTENDED RELEASE ORAL ONCE
Status: COMPLETED | OUTPATIENT
Start: 2024-02-16 | End: 2024-02-16

## 2024-02-16 RX ADMIN — Medication 1000 UNITS: at 09:59

## 2024-02-16 RX ADMIN — POTASSIUM CHLORIDE 40 MEQ: 1500 TABLET, EXTENDED RELEASE ORAL at 11:20

## 2024-02-16 RX ADMIN — PANTOPRAZOLE SODIUM 40 MG: 40 TABLET, DELAYED RELEASE ORAL at 06:21

## 2024-02-16 RX ADMIN — BUPROPION HYDROCHLORIDE 300 MG: 150 TABLET, EXTENDED RELEASE ORAL at 09:59

## 2024-02-16 RX ADMIN — SUCRALFATE 1 G: 1 TABLET ORAL at 15:58

## 2024-02-16 RX ADMIN — CEFAZOLIN SODIUM 2000 MG: 2 SOLUTION INTRAVENOUS at 21:48

## 2024-02-16 RX ADMIN — SODIUM CHLORIDE, SODIUM GLUCONATE, SODIUM ACETATE, POTASSIUM CHLORIDE, MAGNESIUM CHLORIDE, SODIUM PHOSPHATE, DIBASIC, AND POTASSIUM PHOSPHATE 75 ML/HR: .53; .5; .37; .037; .03; .012; .00082 INJECTION, SOLUTION INTRAVENOUS at 15:58

## 2024-02-16 RX ADMIN — SUCRALFATE 1 G: 1 TABLET ORAL at 11:19

## 2024-02-16 RX ADMIN — SODIUM CHLORIDE, SODIUM GLUCONATE, SODIUM ACETATE, POTASSIUM CHLORIDE, MAGNESIUM CHLORIDE, SODIUM PHOSPHATE, DIBASIC, AND POTASSIUM PHOSPHATE 75 ML/HR: .53; .5; .37; .037; .03; .012; .00082 INJECTION, SOLUTION INTRAVENOUS at 01:43

## 2024-02-16 RX ADMIN — METOPROLOL SUCCINATE 50 MG: 50 TABLET, EXTENDED RELEASE ORAL at 20:13

## 2024-02-16 RX ADMIN — PANTOPRAZOLE SODIUM 40 MG: 40 TABLET, DELAYED RELEASE ORAL at 15:58

## 2024-02-16 RX ADMIN — SUCRALFATE 1 G: 1 TABLET ORAL at 20:13

## 2024-02-16 RX ADMIN — METOPROLOL SUCCINATE 50 MG: 50 TABLET, EXTENDED RELEASE ORAL at 09:59

## 2024-02-16 RX ADMIN — CLONAZEPAM 0.5 MG: 0.5 TABLET ORAL at 20:13

## 2024-02-16 RX ADMIN — SUCRALFATE 1 G: 1 TABLET ORAL at 06:21

## 2024-02-16 RX ADMIN — CEFAZOLIN SODIUM 2000 MG: 2 SOLUTION INTRAVENOUS at 05:46

## 2024-02-16 RX ADMIN — CEFAZOLIN SODIUM 2000 MG: 2 SOLUTION INTRAVENOUS at 13:36

## 2024-02-16 NOTE — ASSESSMENT & PLAN NOTE
Gram-negative bacteremia 2 out of 2 with Proteus  Suspect likely secondary to UTI  Clinically improved on IV cefazolin  Repeat blood cultures negative  Plan to continue antibiotics through 2/22

## 2024-02-16 NOTE — ASSESSMENT & PLAN NOTE
Patient without symptoms of UTI however positive UA and reflex to culture with Positive BC   Urine culture growing Proteus and Klebsiella  Sensitive to IV cefazolin  Continue IV cefazolin

## 2024-02-16 NOTE — ASSESSMENT & PLAN NOTE
GI consulted  EGD on 2/13 revealed severe esophagitis  Currently on Protonix  Symptoms have since improved

## 2024-02-16 NOTE — PROGRESS NOTES
[facility]  Progress Note  Name: Lizette Cummings I  MRN: [id number]  Unit/Bed#: -01 I Date of Admission: 2/12/2024   Date of Service: 2/16/2024 I Hospital Day: 4    Assessment/Plan   * MAXI (acute kidney injury) (HCC)  Assessment & Plan  Creatine on admission 2.16, Baseline creatinine normal (0.8)  Secondary to volume depletion  Kidney function since significantly improved  Continue IV fluids  Repeat BMP in the a.m.    Mild protein-calorie malnutrition (HCC)  Assessment & Plan  Malnutrition Findings:   Adult Malnutrition type: Chronic illness  Adult Degree of Malnutrition: Malnutrition of mild degree  Malnutrition Characteristics: Inadequate energy, Weight loss                  360 Statement: malnutrition of mild degree in setting of chronic illness, evidenced by wt loss of 40 lbs/PT over the past year due to inability to tolerate po intake, poor po inake for > 1 month, treated with diet and supplements    BMI Findings:           Body mass index is 22.71 kg/m².       Acute cystitis with hematuria  Assessment & Plan  Patient without symptoms of UTI however positive UA and reflex to culture with Positive BC   Urine culture growing Proteus and Klebsiella  Sensitive to IV cefazolin  Continue IV cefazolin    Bacteremia  Assessment & Plan  Gram-negative bacteremia 2 out of 2 with Proteus  Suspect likely secondary to UTI  Clinically improved on IV cefazolin  Repeat blood cultures negative  Plan to continue antibiotics through 2/22    Insomnia  Assessment & Plan  Patient reports insomnia   Continue Klonopin.  As needed    GERD (gastroesophageal reflux disease)  Assessment & Plan  GI consulted  EGD on 2/13 revealed severe esophagitis  Currently on Protonix  Symptoms have since improved    Hypertension  Assessment & Plan  Continue metoprolol  Hold spironolactone as blood pressure controlled         VTE Pharmacologic Prophylaxis:   Pharmacologic: Pharmacologic VTE Prophylaxis  contraindicated due to hematuria  Mechanical VTE Prophylaxis in Place: Yes    Patient Centered Rounds: I have performed bedside rounds with nursing staff today.    Discussions with Specialists or Other Care Team Provider: cm, nursing    Education and Discussions with Family / Patient: pt, declined family update    Time Spent for Care: 30 minutes.  More than 50% of total time spent on counseling and coordination of care as described above.    Current Length of Stay: 4 day(s)    Current Patient Status: Inpatient   Certification Statement: The patient will continue to require additional inpatient hospital stay due to see below    Discharge Plan: Still requiring IV antibiotics.  Dissipate discharge tomorrow if continues to improve    Code Status: Level 1 - Full Code      Subjective:   Still reports poor oral intake.  However denies chest pain, shortness of breath or any other complaints at this time    Objective:     Vitals:   Temp (24hrs), Av.8 °F (36.6 °C), Min:97.7 °F (36.5 °C), Max:97.9 °F (36.6 °C)    Temp:  [97.7 °F (36.5 °C)-97.9 °F (36.6 °C)] 97.7 °F (36.5 °C)  HR:  [79-99] 99  Resp:  [17] 17  BP: (111-122)/(68-82) 121/69  SpO2:  [96 %-97 %] 96 %  Body mass index is 22.71 kg/m².     Input and Output Summary (last 24 hours):       Intake/Output Summary (Last 24 hours) at 2024 1103  Last data filed at 2/15/2024 1718  Gross per 24 hour   Intake 450 ml   Output 200 ml   Net 250 ml       Physical Exam:     Physical Exam  Constitutional:       General: She is not in acute distress.     Appearance: She is well-developed. She is not diaphoretic.   HENT:      Head: Normocephalic and atraumatic.      Nose: Nose normal.      Mouth/Throat:      Pharynx: No oropharyngeal exudate.   Eyes:      General: No scleral icterus.        Right eye: No discharge.         Left eye: No discharge.      Conjunctiva/sclera: Conjunctivae normal.   Neck:      Thyroid: No thyromegaly.      Vascular: No JVD.   Cardiovascular:      Rate  and Rhythm: Normal rate and regular rhythm.      Heart sounds: Normal heart sounds. No murmur heard.     No friction rub. No gallop.   Pulmonary:      Effort: Pulmonary effort is normal. No respiratory distress.      Breath sounds: Normal breath sounds. No wheezing or rales.   Chest:      Chest wall: No tenderness.   Abdominal:      General: Bowel sounds are normal. There is no distension.      Palpations: Abdomen is soft.      Tenderness: There is no abdominal tenderness. There is no guarding or rebound.   Musculoskeletal:         General: No tenderness or deformity. Normal range of motion.      Cervical back: Normal range of motion and neck supple.   Skin:     General: Skin is warm and dry.      Findings: No erythema or rash.   Neurological:      Mental Status: She is alert. Mental status is at baseline.      Cranial Nerves: No cranial nerve deficit.      Sensory: No sensory deficit.      Motor: No abnormal muscle tone.      Coordination: Coordination normal.           Additional Data:     Labs:    Results from last 7 days   Lab Units 02/16/24  0509   WBC Thousand/uL 5.01   HEMOGLOBIN g/dL 9.8*   HEMATOCRIT % 29.1*   PLATELETS Thousands/uL 171   NEUTROS PCT % 52   LYMPHS PCT % 31   MONOS PCT % 17*   EOS PCT % 0     Results from last 7 days   Lab Units 02/16/24  0509 02/13/24  1945 02/13/24  0436   SODIUM mmol/L 137   < > 131*   POTASSIUM mmol/L 3.4*   < > 3.4*   CHLORIDE mmol/L 102   < > 97   CO2 mmol/L 29   < > 25   BUN mg/dL 11   < > 27*   CREATININE mg/dL 1.02   < > 1.70*   ANION GAP mmol/L 6   < > 9   CALCIUM mg/dL 8.0*   < > 8.7   ALBUMIN g/dL  --   --  2.9*   TOTAL BILIRUBIN mg/dL  --   --  1.73*   ALK PHOS U/L  --   --  89   ALT U/L  --   --  35   AST U/L  --   --  74*   GLUCOSE RANDOM mg/dL 104   < > 126    < > = values in this interval not displayed.                 Results from last 7 days   Lab Units 02/12/24  1438   LACTIC ACID mmol/L 1.1           * I Have Reviewed All Lab Data Listed Above.  *  Additional Pertinent Lab Tests Reviewed: All Labs Within Last 24 Hours Reviewed    Imaging:    Imaging Reports Reviewed Today Include: na  Imaging Personally Reviewed by Myself Includes:  na    Recent Cultures (last 7 days):     Results from last 7 days   Lab Units 02/14/24  1435 02/13/24  0101 02/12/24  1438   BLOOD CULTURE  No Growth at 24 hrs.  No Growth at 24 hrs.  --  Proteus mirabilis*  Proteus mirabilis*   GRAM STAIN RESULT   --   --  Gram negative rods*  Gram negative rods*   URINE CULTURE   --  >100,000 cfu/ml Klebsiella pneumoniae*  >100,000 cfu/ml Escherichia coli*  10,000-19,000 cfu/ml Proteus species*  --        Last 24 Hours Medication List:   Current Facility-Administered Medications   Medication Dose Route Frequency Provider Last Rate    acetaminophen  650 mg Oral Q6H PRN Ana Maria Morillo PA-C      albuterol  2.5 mg Nebulization Q6H PRN Ana Maria Morillo PA-C      aluminum-magnesium hydroxide-simethicone  30 mL Oral Q6H PRN Ana Maria Morillo PA-C      buPROPion  300 mg Oral Daily Ana Maria Morilol PA-C      cefazolin  2,000 mg Intravenous Q8H Diamond Tucker MD 2,000 mg (02/16/24 0546)    cholecalciferol  1,000 Units Oral Daily Ana Maria Morillo PA-C      clonazePAM  0.5 mg Oral HS PRN Ana Maria Morillo PA-C      metoprolol succinate  50 mg Oral BID Ana Maria Morillo PA-C      multi-electrolyte  75 mL/hr Intravenous Continuous Ana Maria Morillo PA-C 75 mL/hr (02/16/24 0143)    pantoprazole  40 mg Oral BID AC Ana Maria Morillo PA-C      potassium chloride  40 mEq Oral Once Hector Archer MD      sucralfate  1 g Oral 4x Daily (AC & HS) Ana Maria Morillo PA-C          Today, Patient Was Seen By: Hector Archer MD    ** Please Note: Dictation voice to text software may have been used in the creation of this document. **

## 2024-02-16 NOTE — ASSESSMENT & PLAN NOTE
Creatine on admission 2.16, Baseline creatinine normal (0.8)  Secondary to volume depletion  Kidney function since significantly improved  Continue IV fluids  Repeat BMP in the a.m.

## 2024-02-17 VITALS
OXYGEN SATURATION: 97 % | BODY MASS INDEX: 22.76 KG/M2 | SYSTOLIC BLOOD PRESSURE: 124 MMHG | HEIGHT: 67 IN | HEART RATE: 77 BPM | TEMPERATURE: 98.6 F | RESPIRATION RATE: 18 BRPM | DIASTOLIC BLOOD PRESSURE: 74 MMHG | WEIGHT: 145 LBS

## 2024-02-17 LAB
ANION GAP SERPL CALCULATED.3IONS-SCNC: 6 MMOL/L
BASOPHILS # BLD AUTO: 0.02 THOUSANDS/ÂΜL (ref 0–0.1)
BASOPHILS NFR BLD AUTO: 0 % (ref 0–1)
BUN SERPL-MCNC: 7 MG/DL (ref 5–25)
CALCIUM SERPL-MCNC: 8.1 MG/DL (ref 8.4–10.2)
CHLORIDE SERPL-SCNC: 101 MMOL/L (ref 96–108)
CO2 SERPL-SCNC: 29 MMOL/L (ref 21–32)
CREAT SERPL-MCNC: 1.05 MG/DL (ref 0.6–1.3)
EOSINOPHIL # BLD AUTO: 0.04 THOUSAND/ÂΜL (ref 0–0.61)
EOSINOPHIL NFR BLD AUTO: 1 % (ref 0–6)
ERYTHROCYTE [DISTWIDTH] IN BLOOD BY AUTOMATED COUNT: 14.1 % (ref 11.6–15.1)
GFR SERPL CREATININE-BSD FRML MDRD: 57 ML/MIN/1.73SQ M
GLUCOSE SERPL-MCNC: 96 MG/DL (ref 65–140)
HCT VFR BLD AUTO: 28.7 % (ref 34.8–46.1)
HGB BLD-MCNC: 9.6 G/DL (ref 11.5–15.4)
IMM GRANULOCYTES # BLD AUTO: 0.02 THOUSAND/UL (ref 0–0.2)
IMM GRANULOCYTES NFR BLD AUTO: 0 % (ref 0–2)
LYMPHOCYTES # BLD AUTO: 1.9 THOUSANDS/ÂΜL (ref 0.6–4.47)
LYMPHOCYTES NFR BLD AUTO: 37 % (ref 14–44)
MCH RBC QN AUTO: 37.9 PG (ref 26.8–34.3)
MCHC RBC AUTO-ENTMCNC: 33.4 G/DL (ref 31.4–37.4)
MCV RBC AUTO: 113 FL (ref 82–98)
MONOCYTES # BLD AUTO: 0.66 THOUSAND/ÂΜL (ref 0.17–1.22)
MONOCYTES NFR BLD AUTO: 13 % (ref 4–12)
NEUTROPHILS # BLD AUTO: 2.44 THOUSANDS/ÂΜL (ref 1.85–7.62)
NEUTS SEG NFR BLD AUTO: 49 % (ref 43–75)
NRBC BLD AUTO-RTO: 0 /100 WBCS
PLATELET # BLD AUTO: 192 THOUSANDS/UL (ref 149–390)
PMV BLD AUTO: 10.4 FL (ref 8.9–12.7)
POTASSIUM SERPL-SCNC: 3.3 MMOL/L (ref 3.5–5.3)
RBC # BLD AUTO: 2.53 MILLION/UL (ref 3.81–5.12)
SODIUM SERPL-SCNC: 136 MMOL/L (ref 135–147)
WBC # BLD AUTO: 5.08 THOUSAND/UL (ref 4.31–10.16)

## 2024-02-17 PROCEDURE — 85025 COMPLETE CBC W/AUTO DIFF WBC: CPT | Performed by: INTERNAL MEDICINE

## 2024-02-17 PROCEDURE — 99239 HOSP IP/OBS DSCHRG MGMT >30: CPT | Performed by: INTERNAL MEDICINE

## 2024-02-17 PROCEDURE — 80048 BASIC METABOLIC PNL TOTAL CA: CPT | Performed by: INTERNAL MEDICINE

## 2024-02-17 RX ORDER — POTASSIUM CHLORIDE 20 MEQ/1
40 TABLET, EXTENDED RELEASE ORAL ONCE
Status: COMPLETED | OUTPATIENT
Start: 2024-02-17 | End: 2024-02-17

## 2024-02-17 RX ORDER — BUPROPION HYDROCHLORIDE 300 MG/1
300 TABLET ORAL DAILY
Qty: 30 TABLET | Refills: 0 | Status: SHIPPED | OUTPATIENT
Start: 2024-02-18

## 2024-02-17 RX ORDER — CEPHALEXIN 500 MG/1
500 CAPSULE ORAL EVERY 6 HOURS SCHEDULED
Qty: 24 CAPSULE | Refills: 0 | Status: SHIPPED | OUTPATIENT
Start: 2024-02-17 | End: 2024-02-23

## 2024-02-17 RX ORDER — SUCRALFATE 1 G/1
1 TABLET ORAL
Qty: 56 TABLET | Refills: 0 | Status: SHIPPED | OUTPATIENT
Start: 2024-02-17

## 2024-02-17 RX ADMIN — CEFAZOLIN SODIUM 2000 MG: 2 SOLUTION INTRAVENOUS at 05:33

## 2024-02-17 RX ADMIN — POTASSIUM CHLORIDE 40 MEQ: 1500 TABLET, EXTENDED RELEASE ORAL at 10:09

## 2024-02-17 RX ADMIN — Medication 1000 UNITS: at 08:49

## 2024-02-17 RX ADMIN — METOPROLOL SUCCINATE 50 MG: 50 TABLET, EXTENDED RELEASE ORAL at 08:49

## 2024-02-17 RX ADMIN — SUCRALFATE 1 G: 1 TABLET ORAL at 05:42

## 2024-02-17 RX ADMIN — PANTOPRAZOLE SODIUM 40 MG: 40 TABLET, DELAYED RELEASE ORAL at 05:42

## 2024-02-17 RX ADMIN — SUCRALFATE 1 G: 1 TABLET ORAL at 11:12

## 2024-02-17 RX ADMIN — BUPROPION HYDROCHLORIDE 300 MG: 150 TABLET, EXTENDED RELEASE ORAL at 08:49

## 2024-02-17 NOTE — ASSESSMENT & PLAN NOTE
Creatine on admission 2.16, Baseline creatinine normal (0.8)  Secondary to volume depletion  Has since resolved with IV fluids

## 2024-02-17 NOTE — PLAN OF CARE
Problem: Potential for Falls  Goal: Patient will remain free of falls  Description: INTERVENTIONS:  - Educate patient/family on patient safety including physical limitations  - Instruct patient to call for assistance with activity   - Consult OT/PT to assist with strengthening/mobility   - Keep Call bell within reach  - Keep bed low and locked with side rails adjusted as appropriate  - Keep care items and personal belongings within reach  - Initiate and maintain comfort rounds  - Make Fall Risk Sign visible to staff  - Offer Toileting every 2 Hours, in advance of need  - Initiate/Maintain bed alarm  - Obtain necessary fall risk management equipment walker  - Apply yellow socks and bracelet for high fall risk patients  - Consider moving patient to room near nurses station  Outcome: Progressing     Problem: Nutrition/Hydration-ADULT  Goal: Nutrient/Hydration intake appropriate for improving, restoring or maintaining nutritional needs  Description: Monitor and assess patient's nutrition/hydration status for malnutrition. Collaborate with interdisciplinary team and initiate plan and interventions as ordered.  Monitor patient's weight and dietary intake as ordered or per policy. Utilize nutrition screening tool and intervene as necessary. Determine patient's food preferences and provide high-protein, high-caloric foods as appropriate.     INTERVENTIONS:  - Monitor oral intake, urinary output, labs, and treatment plans  - Assess nutrition and hydration status and recommend course of action  - Evaluate amount of meals eaten  - Assist patient with eating if necessary   - Allow adequate time for meals  - Recommend/ encourage appropriate diets, oral nutritional supplements, and vitamin/mineral supplements  - Order, calculate, and assess calorie counts as needed  - Recommend, monitor, and adjust tube feedings and TPN/PPN based on assessed needs  - Assess need for intravenous fluids  - Provide specific nutrition/hydration  education as appropriate  - Include patient/family/caregiver in decisions related to nutrition  Outcome: Progressing     Problem: GASTROINTESTINAL - ADULT  Goal: Minimal or absence of nausea and/or vomiting  Description: INTERVENTIONS:  - Administer IV fluids if ordered to ensure adequate hydration  - Maintain NPO status until nausea and vomiting are resolved  - Nasogastric tube if ordered  - Administer ordered antiemetic medications as needed  - Provide nonpharmacologic comfort measures as appropriate  - Advance diet as tolerated, if ordered  - Consider nutrition services referral to assist patient with adequate nutrition and appropriate food choices  Outcome: Progressing  Goal: Maintains or returns to baseline bowel function  Description: INTERVENTIONS:  - Assess bowel function  - Encourage oral fluids to ensure adequate hydration  - Administer IV fluids if ordered to ensure adequate hydration  - Administer ordered medications as needed  - Encourage mobilization and activity  - Consider nutritional services referral to assist patient with adequate nutrition and appropriate food choices  Outcome: Progressing  Goal: Maintains adequate nutritional intake  Description: INTERVENTIONS:  - Monitor percentage of each meal consumed  - Identify factors contributing to decreased intake, treat as appropriate  - Assist with meals as needed  - Monitor I&O, weight, and lab values if indicated  - Obtain nutrition services referral as needed  Outcome: Progressing  Goal: Oral mucous membranes remain intact  Description: INTERVENTIONS  - Assess oral mucosa and hygiene practices  - Implement preventative oral hygiene regimen  - Implement oral medicated treatments as ordered  - Initiate Nutrition services referral as needed  Outcome: Progressing     Problem: INFECTION - ADULT  Goal: Absence or prevention of progression during hospitalization  Description: INTERVENTIONS:  - Assess and monitor for signs and symptoms of infection  -  Monitor lab/diagnostic results  - Monitor all insertion sites, i.e. indwelling lines, tubes, and drains  - Monitor endotracheal if appropriate and nasal secretions for changes in amount and color  - [place] appropriate cooling/warming therapies per order  - Administer medications as ordered  - Instruct and encourage patient and family to use good hand hygiene technique  - Identify and instruct in appropriate isolation precautions for identified infection/condition  Outcome: Progressing     Problem: DISCHARGE PLANNING  Goal: Discharge to home or other facility with appropriate resources  Description: INTERVENTIONS:  - Identify barriers to discharge w/patient and caregiver  - Arrange for needed discharge resources and transportation as appropriate  - Identify discharge learning needs (meds, wound care, etc.)  - Arrange for interpretive services to assist at discharge as needed  - Refer to Case Management Department for coordinating discharge planning if the patient needs post-hospital services based on physician/advanced practitioner order or complex needs related to functional status, cognitive ability, or social support system  Outcome: Progressing     Problem: Knowledge Deficit  Goal: Patient/family/caregiver demonstrates understanding of disease process, treatment plan, medications, and discharge instructions  Description: Complete learning assessment and assess knowledge base.  Interventions:  - Provide teaching at level of understanding  - Provide teaching via preferred learning methods  Outcome: Progressing

## 2024-02-17 NOTE — ASSESSMENT & PLAN NOTE
GI consulted  EGD on 2/13 revealed severe esophagitis  Symptoms have since resolved  Continue Protonix

## 2024-02-17 NOTE — DISCHARGE SUMMARY
[facility]  Discharge- Lizette Cummings 1960, 63 y.o. female MRN: [id number]  Unit/Bed#: -01 Encounter: [id number]  Primary Care Provider: Susan Kelly DO   Date and time admitted to hospital: 2/12/2024  1:57 PM    * MAXI (acute kidney injury) (HCC)  Assessment & Plan  Creatine on admission 2.16, Baseline creatinine normal (0.8)  Secondary to volume depletion  Has since resolved with IV fluids    Mild protein-calorie malnutrition (HCC)  Assessment & Plan  Malnutrition Findings:   Adult Malnutrition type: Chronic illness  Adult Degree of Malnutrition: Malnutrition of mild degree  Malnutrition Characteristics: Inadequate energy, Weight loss                  360 Statement: malnutrition of mild degree in setting of chronic illness, evidenced by wt loss of 40 lbs/PT over the past year due to inability to tolerate po intake, poor po inake for > 1 month, treated with diet and supplements    BMI Findings:           Body mass index is 22.71 kg/m².       Acute cystitis with hematuria  Assessment & Plan  Patient without symptoms of UTI however positive UA and reflex to culture with Positive BC   Urine culture growing Proteus and Klebsiella  Sensitive to IV cefazolin  Transition to oral Keflex to complete 10-day therapy through 2/22    Bacteremia  Assessment & Plan  Gram-negative bacteremia 2 out of 2 with Proteus  Suspect likely secondary to UTI  Since significant improved with IV cefazolin  Will transition to oral Keflex to complete 10-day therapy through 2//22    GERD (gastroesophageal reflux disease)  Assessment & Plan  GI consulted  EGD on 2/13 revealed severe esophagitis  Symptoms have since resolved  Continue Protonix    Hypertension  Assessment & Plan  Continue metoprolol  Blood pressure remains adequately controlled  Hold spironolactone on discharge      Discharging Physician / Practitioner: Hector Archer MD  PCP: Susan Kelly DO  Admission Date:   Admission  Orders (From admission, onward)       Ordered        02/12/24 1555  INPATIENT ADMISSION  Once                          Discharge Date: 02/17/24    Medical Problems       Resolved Problems  Date Reviewed: 2/17/2024            Resolved    Hyponatremia 2/14/2024     Resolved by  Ana Maria Morillo PA-C          Consultations During Hospital Stay:  Infectious disease    Procedures Performed:   none    Significant Findings / Test Results:   EGD    Result Date: 2/13/2024  Impression: 6 cm hiatal hernia Mild abnormal mucosa, consistent with gastritis in the antrum; performed cold forceps biopsy The duodenal bulb, 1st part of the duodenum and 2nd part of the duodenum appeared normal. Performed random biopsy. Grade D esophagitis in the upper third of the esophagus, middle third of the esophagus, lower third of the esophagus, GE junction and Z-line Peptic intrinsic stricture in the esophagus Finding could be related to chronic reflux in the setting of large hernia and daily alcohol use however cannot rule out an underlying Esophageal motility disorders. RECOMMENDATION:  Await pathology results  Use Carafate suspension 1gm po QID for 3 weeks Use PPI 40 mg BID Avoid NSAIDs Continue GERD lifestyle & Diet modifications( liquid diet for 2 weeks) Avoid NSAIDs Avoid ETOH Avoid smoking Avoid Marijuana use Weight management  Repeat EGD in 2 months.    Kaur Skelton MD     XR chest portable    Result Date: 2/13/2024  Impression: No acute cardiopulmonary disease. Healing anterior right rib fractures. Workstation performed: [id number]     CT abdomen pelvis wo contrast    Result Date: 2/12/2024  Impression: No acute intra-abdominal abnormality. Chronic findings as described above. Workstation performed: [id number]      Incidental Findings:   none     Test Results Pending at Discharge (will require follow up):   none     Outpatient Tests Requested:  none    Complications:  none    Reason for Admission:     Hospital Course:     Lizette  "Zoila is a 63 y.o. female patient who originally presented to the hospital on 2/12/2024 with past medical history significant GERD, hypertension initially presented with acute kidney injury likely from bacteremia from a UTI  Symptoms of since significant proved with IV cefazolin.  Will complete oral Keflex on 2/22.  Was noted to have intermittent hematuria that is since improved.  Will follow-up outpatient with primary care doctor.  Also had EGD which revealed severe esophagitis improved with oral PPI    Please see above list of diagnoses and related plan for additional information.     Condition at Discharge: stable     Discharge Day Visit / Exam:     Subjective: Denies chest pain, shortness of breath, cough, fevers, chills  Vitals: Blood Pressure: 124/74 (02/17/24 0623)  Pulse: 77 (02/17/24 0623)  Temperature: 98.6 °F (37 °C) (02/17/24 0623)  Temp Source: Temporal (02/15/24 1559)  Respirations: 18 (02/17/24 0623)  Height: 5' 7\" (170.2 cm) (02/13/24 1534)  Weight - Scale: 65.8 kg (145 lb) (02/13/24 1534)  SpO2: 97 % (02/17/24 0623)  Exam:   Physical Exam  Constitutional:       General: She is not in acute distress.     Appearance: She is well-developed. She is not diaphoretic.   HENT:      Head: Normocephalic and atraumatic.      Nose: Nose normal.      Mouth/Throat:      Pharynx: No oropharyngeal exudate.   Eyes:      General: No scleral icterus.        Right eye: No discharge.         Left eye: No discharge.      Conjunctiva/sclera: Conjunctivae normal.   Neck:      Thyroid: No thyromegaly.      Vascular: No JVD.   Cardiovascular:      Rate and Rhythm: Normal rate and regular rhythm.      Heart sounds: Normal heart sounds. No murmur heard.     No friction rub. No gallop.   Pulmonary:      Effort: Pulmonary effort is normal. No respiratory distress.      Breath sounds: Normal breath sounds. No wheezing or rales.   Chest:      Chest wall: No tenderness.   Abdominal:      General: Bowel sounds are normal. " There is no distension.      Palpations: Abdomen is soft.      Tenderness: There is no abdominal tenderness. There is no guarding or rebound.   Musculoskeletal:         General: No tenderness or deformity. Normal range of motion.      Cervical back: Normal range of motion and neck supple.   Skin:     General: Skin is warm and dry.      Findings: No erythema or rash.   Neurological:      Mental Status: She is alert. Mental status is at baseline.      Cranial Nerves: No cranial nerve deficit.      Sensory: No sensory deficit.      Motor: No abnormal muscle tone.      Coordination: Coordination normal.       (  Discussion with Family: pt, attempted to call  w/o response    Discharge instructions/Information to patient and family:   See after visit summary for information provided to patient and family.      Provisions for Follow-Up Care:  See after visit summary for information related to follow-up care and any pertinent home health orders.      Disposition:     Home    For Discharges to [facility] SNF:   Not Applicable to this Patient - Not Applicable to this Patient    Planned Readmission: none     Discharge Statement:  I spent 60 minutes discharging the patient. This time was spent on the day of discharge. I had direct contact with the patient on the day of discharge. Greater than 50% of the total time was spent examining patient, answering all patient questions, arranging and discussing plan of care with patient as well as directly providing post-discharge instructions.  Additional time then spent on discharge activities.    Discharge Medications:  See after visit summary for reconciled discharge medications provided to patient and family.      ** Please Note: This note has been constructed using a voice recognition system **

## 2024-02-17 NOTE — ASSESSMENT & PLAN NOTE
Gram-negative bacteremia 2 out of 2 with Proteus  Suspect likely secondary to UTI  Since significant improved with IV cefazolin  Will transition to oral Keflex to complete 10-day therapy through 2//22

## 2024-02-17 NOTE — CASE MANAGEMENT
Case Management Discharge Planning Note    Patient name Lizette Cummings  Location /-01 MRN [id number]  : 1960 Date 2024       Current Admission Date: 2024  Current Admission Diagnosis:MAXI (acute kidney injury) ([facility])   Patient Active Problem List    Diagnosis Date Noted    Mild protein-calorie malnutrition (HCC) 02/15/2024    Bacteremia 2024    Acute cystitis with hematuria 2024    MAXI (acute kidney injury) (Formerly [facility]) 2024    Hypertension 2024    Generalized weakness 2024    GERD (gastroesophageal reflux disease) 2024    Insomnia 2024    Electrolyte disturbance 2024      LOS (days): 5  Geometric Mean LOS (GMLOS) (days): 3.1  Days to GMLOS:-1.7     OBJECTIVE:  Risk of Unplanned Readmission Score: 13.37         Current admission status: Inpatient   Preferred Pharmacy:   [facility] [facility] [place] - [address] [facility]  [address] [facility]   Phone: [phone] Fax: [phone]    [id number] [place] - [address]  [address] [address]  Phone: [phone] Fax: [phone]    Primary Care Provider: Susan Kelly DO    Primary Insurance: MEDICARE  Secondary Insurance: [facility]    DISCHARGE DETAILS:     CM spoke to [facility] via [facility] to inform them of discharge today and sent script via [employer]. CM faxed AVS as well.     CM to follow patients care and d/c needs.

## 2024-02-17 NOTE — ASSESSMENT & PLAN NOTE
Continue metoprolol  Blood pressure remains adequately controlled  Hold spironolactone on discharge

## 2024-02-17 NOTE — ASSESSMENT & PLAN NOTE
Patient without symptoms of UTI however positive UA and reflex to culture with Positive BC   Urine culture growing Proteus and Klebsiella  Sensitive to IV cefazolin  Transition to oral Keflex to complete 10-day therapy through 2/22

## 2024-02-19 LAB
BACTERIA BLD CULT: NORMAL
BACTERIA BLD CULT: NORMAL

## 2024-02-21 PROBLEM — N30.01 ACUTE CYSTITIS WITH HEMATURIA: Status: RESOLVED | Noted: 2024-02-13 | Resolved: 2024-02-21

## 2024-02-21 LAB
DME PARACHUTE DELIVERY DATE ACTUAL: NORMAL
DME PARACHUTE DELIVERY DATE REQUESTED: NORMAL
DME PARACHUTE DELIVERY NOTE: NORMAL
DME PARACHUTE ITEM DESCRIPTION: NORMAL
DME PARACHUTE ORDER STATUS: NORMAL
DME PARACHUTE SUPPLIER NAME: NORMAL
DME PARACHUTE SUPPLIER PHONE: NORMAL

## 2024-03-04 ENCOUNTER — TELEPHONE (OUTPATIENT)
Age: 64
End: 2024-03-04

## 2024-03-04 NOTE — TELEPHONE ENCOUNTER
Pt calling for a refill on medication and to schedule repeat EGD. Pt see Bob. Provided phone number

## 2024-04-04 ENCOUNTER — ANESTHESIA EVENT (OUTPATIENT)
Dept: ANESTHESIOLOGY | Facility: HOSPITAL | Age: 64
End: 2024-04-04

## 2024-04-04 ENCOUNTER — ANESTHESIA (OUTPATIENT)
Dept: ANESTHESIOLOGY | Facility: HOSPITAL | Age: 64
End: 2024-04-04

## 2024-04-04 NOTE — ANESTHESIA PREPROCEDURE EVALUATION
Procedure:  PRE-OP ONLY    Relevant Problems   CARDIO   (+) Hypertension      GI/HEPATIC   (+) GERD (gastroesophageal reflux disease)      /RENAL   (+) MAXI (acute kidney injury) (HCC)        Physical Exam    Airway    Mallampati score: II  TM Distance: >3 FB  Neck ROM: full     Dental   No notable dental hx     Cardiovascular  Cardiovascular exam normal    Pulmonary  Pulmonary exam normal     Other Findings  post-pubertal.    Normal sinus rhythm  Nonspecific T wave abnormality  Abnormal ECG  When compared with ECG of 12-FEB-2024 14:44, (unconfirmed)  T wave inversion no longer evident in Inferior leads  T wave inversion no longer evident in Lateral leads  QT has shortened         Anesthesia Plan  ASA Score- 3     Anesthesia Type- IV sedation with anesthesia with ASA Monitors.         Additional Monitors:     Airway Plan:            Plan Factors-Exercise tolerance (METS): >4 METS.    Chart reviewed. EKG reviewed. Imaging results reviewed. Existing labs reviewed. Patient summary reviewed.    Patient is not a current smoker.      Obstructive sleep apnea risk education given perioperatively.        Induction- intravenous.    Postoperative Plan-     Informed Consent- Anesthetic plan and risks discussed with patient.  I personally reviewed this patient with the CRNA. Discussed and agreed on the Anesthesia Plan with the CRNA..

## 2024-04-05 ENCOUNTER — HOSPITAL ENCOUNTER (OUTPATIENT)
Dept: GASTROENTEROLOGY | Facility: HOSPITAL | Age: 64
Setting detail: OUTPATIENT SURGERY
End: 2024-04-05
Attending: HOSPITALIST
Payer: MEDICARE

## 2024-04-05 ENCOUNTER — ANESTHESIA EVENT (OUTPATIENT)
Dept: GASTROENTEROLOGY | Facility: HOSPITAL | Age: 64
End: 2024-04-05

## 2024-04-05 ENCOUNTER — ANESTHESIA (OUTPATIENT)
Dept: GASTROENTEROLOGY | Facility: HOSPITAL | Age: 64
End: 2024-04-05

## 2024-04-05 VITALS
WEIGHT: 143 LBS | RESPIRATION RATE: 20 BRPM | SYSTOLIC BLOOD PRESSURE: 126 MMHG | BODY MASS INDEX: 22.44 KG/M2 | DIASTOLIC BLOOD PRESSURE: 67 MMHG | TEMPERATURE: 97.4 F | HEART RATE: 63 BPM | OXYGEN SATURATION: 98 % | HEIGHT: 67 IN

## 2024-04-05 DIAGNOSIS — K21.9 GERD (GASTROESOPHAGEAL REFLUX DISEASE): ICD-10-CM

## 2024-04-05 DIAGNOSIS — K20.90 ESOPHAGITIS, UNSPECIFIED WITHOUT BLEEDING: ICD-10-CM

## 2024-04-05 RX ORDER — PROPOFOL 10 MG/ML
INJECTION, EMULSION INTRAVENOUS AS NEEDED
Status: DISCONTINUED | OUTPATIENT
Start: 2024-04-05 | End: 2024-04-05

## 2024-04-05 RX ORDER — SODIUM CHLORIDE, SODIUM LACTATE, POTASSIUM CHLORIDE, CALCIUM CHLORIDE 600; 310; 30; 20 MG/100ML; MG/100ML; MG/100ML; MG/100ML
INJECTION, SOLUTION INTRAVENOUS CONTINUOUS PRN
Status: DISCONTINUED | OUTPATIENT
Start: 2024-04-05 | End: 2024-04-05

## 2024-04-05 RX ORDER — SIMETHICONE 40MG/0.6ML
SUSPENSION, DROPS(FINAL DOSAGE FORM)(ML) ORAL AS NEEDED
Status: COMPLETED | OUTPATIENT
Start: 2024-04-05 | End: 2024-04-05

## 2024-04-05 RX ORDER — LIDOCAINE HYDROCHLORIDE 20 MG/ML
INJECTION, SOLUTION EPIDURAL; INFILTRATION; INTRACAUDAL; PERINEURAL AS NEEDED
Status: DISCONTINUED | OUTPATIENT
Start: 2024-04-05 | End: 2024-04-05

## 2024-04-05 RX ORDER — SUCRALFATE 1 G/1
1 TABLET ORAL
Qty: 120 TABLET | Refills: 0 | Status: SHIPPED | OUTPATIENT
Start: 2024-04-05 | End: 2024-05-05

## 2024-04-05 RX ADMIN — SODIUM CHLORIDE, SODIUM LACTATE, POTASSIUM CHLORIDE, AND CALCIUM CHLORIDE: .6; .31; .03; .02 INJECTION, SOLUTION INTRAVENOUS at 08:20

## 2024-04-05 RX ADMIN — PROPOFOL 90 MG: 10 INJECTION, EMULSION INTRAVENOUS at 08:33

## 2024-04-05 RX ADMIN — Medication 40 MG: at 08:34

## 2024-04-05 RX ADMIN — PROPOFOL 50 MG: 10 INJECTION, EMULSION INTRAVENOUS at 08:34

## 2024-04-05 RX ADMIN — LIDOCAINE HYDROCHLORIDE 100 MG: 20 INJECTION, SOLUTION EPIDURAL; INFILTRATION; INTRACAUDAL; PERINEURAL at 08:33

## 2024-04-05 NOTE — ANESTHESIA PREPROCEDURE EVALUATION
Procedure:  EGD    Relevant Problems   CARDIO   (+) Hypertension      GI/HEPATIC   (+) GERD (gastroesophageal reflux disease)      /RENAL   (+) MAXI (acute kidney injury) (HCC)        Physical Exam    Airway    Mallampati score: II  TM Distance: >3 FB  Neck ROM: full     Dental   No notable dental hx     Cardiovascular  Cardiovascular exam normal    Pulmonary  Pulmonary exam normal     Other Findings  post-pubertal.    Normal sinus rhythm  Nonspecific T wave abnormality  Abnormal ECG  When compared with ECG of 12-FEB-2024 14:44, (unconfirmed)  T wave inversion no longer evident in Inferior leads  T wave inversion no longer evident in Lateral leads  QT has shortened         Anesthesia Plan  ASA Score- 3     Anesthesia Type- IV sedation with anesthesia with ASA Monitors.         Additional Monitors:     Airway Plan:            Plan Factors-Exercise tolerance (METS): >4 METS.    Chart reviewed. EKG reviewed. Imaging results reviewed. Existing labs reviewed. Patient summary reviewed.    Patient is not a current smoker.      Obstructive sleep apnea risk education given perioperatively.        Induction- intravenous.    Postoperative Plan-     Informed Consent- Anesthetic plan and risks discussed with patient.  I personally reviewed this patient with the CRNA. Discussed and agreed on the Anesthesia Plan with the CRNA..

## 2024-04-05 NOTE — ANESTHESIA POSTPROCEDURE EVALUATION
Post-Op Assessment Note    CV Status:  Stable    Pain management: adequate       Mental Status:  Sleepy   Hydration Status:  Euvolemic   PONV Controlled:  Controlled   Airway Patency:  Patent     Post Op Vitals Reviewed: Yes    No anethesia notable event occurred.    Staff: CRNA               BP   95/53   Temp   97.8   Pulse  68   Resp   18   SpO2  98%

## 2024-04-05 NOTE — H&P
Procedure(s):    Esophagogastroduodenuoscopy with the indication(s) of dyspeptic symptoms    Endoscopy Pre-Procedure Assessment:  Prior to the procedure, the patient is identified.  The patient's history, medications, and allergies have been reviewed.  The patient is competent.  The risks and benefits of the procedure procedure and the planned sedation have been discussed with the patient.  All questions have been answered and informed consent for the procedure has been obtained.    Vitals:    04/05/24 0807   BP: 149/71   Pulse: 75   Resp: 18   Temp: 97.5 °F (36.4 °C)   SpO2: 99%       Physical Exam:  Physical Exam  Eyes:      Conjunctiva/sclera: Conjunctivae normal.   Cardiovascular:      Rate and Rhythm: Normal rate.   Pulmonary:      Effort: Pulmonary effort is normal.   Abdominal:      Palpations: Abdomen is soft.   Neurological:      General: No focal deficit present.      Mental Status: She is alert.   Psychiatric:         Mood and Affect: Mood normal.                Consent:    We have discussed the procedure in detail. We reviewed risks, benefits and alternative as well as protentional complications including and not limited to medication side effect , infection, bleeding, perforation and the potential need for surgery, ICU admission, CPR, as well as the need for blood product transfusion. Patient verbalized understanding and agreement. All patient questions were answered.     After reviewed the risks and benefits, the patient is deemed in satisfactory condition to undergo the procedure.  The anesthesia plan is to use monitored anesthesia care (MAC).      04/05/24

## 2024-04-24 ENCOUNTER — HOSPITAL ENCOUNTER (OUTPATIENT)
Dept: ULTRASOUND IMAGING | Facility: HOSPITAL | Age: 64
Discharge: HOME | End: 2024-04-24
Payer: MEDICARE

## 2024-04-24 DIAGNOSIS — N17.9 AKI (ACUTE KIDNEY INJURY) (HCC): ICD-10-CM

## 2024-04-24 PROCEDURE — 76775 US EXAM ABDO BACK WALL LIM: CPT

## 2024-10-20 ENCOUNTER — HOSPITAL ENCOUNTER (EMERGENCY)
Facility: HOSPITAL | Age: 64
Discharge: HOME | End: 2024-10-20
Attending: EMERGENCY MEDICINE
Payer: MEDICARE

## 2024-10-20 VITALS
WEIGHT: 145.72 LBS | SYSTOLIC BLOOD PRESSURE: 158 MMHG | HEART RATE: 78 BPM | BODY MASS INDEX: 22.87 KG/M2 | RESPIRATION RATE: 16 BRPM | TEMPERATURE: 97.9 F | OXYGEN SATURATION: 96 % | DIASTOLIC BLOOD PRESSURE: 77 MMHG | HEIGHT: 67 IN

## 2024-10-20 DIAGNOSIS — M79.18 MUSCULOSKELETAL PAIN: ICD-10-CM

## 2024-10-20 DIAGNOSIS — N39.0 URINARY TRACT INFECTION: Primary | ICD-10-CM

## 2024-10-20 DIAGNOSIS — R10.9 ABDOMINAL PAIN: ICD-10-CM

## 2024-10-20 DIAGNOSIS — R11.2 NAUSEA & VOMITING: ICD-10-CM

## 2024-10-20 LAB
ALBUMIN SERPL BCG-MCNC: 4.3 G/DL (ref 3.5–5)
ALP SERPL-CCNC: 65 U/L (ref 34–104)
ALT SERPL W P-5'-P-CCNC: 40 U/L (ref 7–52)
ANION GAP SERPL CALCULATED.3IONS-SCNC: 16 MMOL/L (ref 4–13)
AST SERPL W P-5'-P-CCNC: 83 U/L (ref 13–39)
BACTERIA UR QL AUTO: ABNORMAL /HPF
BASOPHILS # BLD AUTO: 0.04 THOUSANDS/ΜL (ref 0–0.1)
BASOPHILS NFR BLD AUTO: 1 % (ref 0–1)
BILIRUB SERPL-MCNC: 2.52 MG/DL (ref 0.2–1)
BILIRUB UR QL STRIP: ABNORMAL
BUN SERPL-MCNC: 19 MG/DL (ref 5–25)
CALCIUM SERPL-MCNC: 10.6 MG/DL (ref 8.4–10.2)
CHLORIDE SERPL-SCNC: 91 MMOL/L (ref 96–108)
CLARITY UR: ABNORMAL
CO2 SERPL-SCNC: 29 MMOL/L (ref 21–32)
COLOR UR: ABNORMAL
CREAT SERPL-MCNC: 1.22 MG/DL (ref 0.6–1.3)
EOSINOPHIL # BLD AUTO: 0.02 THOUSAND/ΜL (ref 0–0.61)
EOSINOPHIL NFR BLD AUTO: 0 % (ref 0–6)
ERYTHROCYTE [DISTWIDTH] IN BLOOD BY AUTOMATED COUNT: 12.1 % (ref 11.6–15.1)
GFR SERPL CREATININE-BSD FRML MDRD: 47 ML/MIN/1.73SQ M
GLUCOSE SERPL-MCNC: 123 MG/DL (ref 65–140)
GLUCOSE UR STRIP-MCNC: NEGATIVE MG/DL
HCT VFR BLD AUTO: 38.2 % (ref 34.8–46.1)
HGB BLD-MCNC: 13.1 G/DL (ref 11.5–15.4)
HGB UR QL STRIP.AUTO: ABNORMAL
IMM GRANULOCYTES # BLD AUTO: 0.02 THOUSAND/UL (ref 0–0.2)
IMM GRANULOCYTES NFR BLD AUTO: 0 % (ref 0–2)
KETONES UR STRIP-MCNC: ABNORMAL MG/DL
LEUKOCYTE ESTERASE UR QL STRIP: ABNORMAL
LIPASE SERPL-CCNC: 11 U/L (ref 11–82)
LYMPHOCYTES # BLD AUTO: 1.67 THOUSANDS/ΜL (ref 0.6–4.47)
LYMPHOCYTES NFR BLD AUTO: 25 % (ref 14–44)
MCH RBC QN AUTO: 36.7 PG (ref 26.8–34.3)
MCHC RBC AUTO-ENTMCNC: 34.3 G/DL (ref 31.4–37.4)
MCV RBC AUTO: 107 FL (ref 82–98)
MONOCYTES # BLD AUTO: 0.57 THOUSAND/ΜL (ref 0.17–1.22)
MONOCYTES NFR BLD AUTO: 9 % (ref 4–12)
MUCOUS THREADS UR QL AUTO: ABNORMAL
NEUTROPHILS # BLD AUTO: 4.27 THOUSANDS/ΜL (ref 1.85–7.62)
NEUTS SEG NFR BLD AUTO: 65 % (ref 43–75)
NITRITE UR QL STRIP: NEGATIVE
NON-SQ EPI CELLS URNS QL MICRO: ABNORMAL /HPF
NRBC BLD AUTO-RTO: 0 /100 WBCS
PH UR STRIP.AUTO: 6.5 [PH]
PLATELET # BLD AUTO: 245 THOUSANDS/UL (ref 149–390)
PMV BLD AUTO: 8.8 FL (ref 8.9–12.7)
POTASSIUM SERPL-SCNC: 3.6 MMOL/L (ref 3.5–5.3)
PROT SERPL-MCNC: 7.8 G/DL (ref 6.4–8.4)
PROT UR STRIP-MCNC: ABNORMAL MG/DL
RBC # BLD AUTO: 3.57 MILLION/UL (ref 3.81–5.12)
RBC #/AREA URNS AUTO: ABNORMAL /HPF
SODIUM SERPL-SCNC: 136 MMOL/L (ref 135–147)
SP GR UR STRIP.AUTO: 1.02 (ref 1–1.03)
UROBILINOGEN UR QL STRIP.AUTO: 1 E.U./DL
WBC # BLD AUTO: 6.59 THOUSAND/UL (ref 4.31–10.16)
WBC #/AREA URNS AUTO: ABNORMAL /HPF

## 2024-10-20 PROCEDURE — 99285 EMERGENCY DEPT VISIT HI MDM: CPT | Performed by: EMERGENCY MEDICINE

## 2024-10-20 PROCEDURE — 99284 EMERGENCY DEPT VISIT MOD MDM: CPT

## 2024-10-20 PROCEDURE — 96375 TX/PRO/DX INJ NEW DRUG ADDON: CPT

## 2024-10-20 PROCEDURE — 81001 URINALYSIS AUTO W/SCOPE: CPT | Performed by: EMERGENCY MEDICINE

## 2024-10-20 PROCEDURE — 87086 URINE CULTURE/COLONY COUNT: CPT | Performed by: EMERGENCY MEDICINE

## 2024-10-20 PROCEDURE — 36415 COLL VENOUS BLD VENIPUNCTURE: CPT | Performed by: EMERGENCY MEDICINE

## 2024-10-20 PROCEDURE — 96361 HYDRATE IV INFUSION ADD-ON: CPT

## 2024-10-20 PROCEDURE — 80053 COMPREHEN METABOLIC PANEL: CPT | Performed by: EMERGENCY MEDICINE

## 2024-10-20 PROCEDURE — 85025 COMPLETE CBC W/AUTO DIFF WBC: CPT | Performed by: EMERGENCY MEDICINE

## 2024-10-20 PROCEDURE — 96365 THER/PROPH/DIAG IV INF INIT: CPT

## 2024-10-20 PROCEDURE — 83690 ASSAY OF LIPASE: CPT | Performed by: EMERGENCY MEDICINE

## 2024-10-20 RX ORDER — DEXAMETHASONE SODIUM PHOSPHATE 10 MG/ML
10 INJECTION, SOLUTION INTRAMUSCULAR; INTRAVENOUS ONCE
Status: COMPLETED | OUTPATIENT
Start: 2024-10-20 | End: 2024-10-20

## 2024-10-20 RX ORDER — IBUPROFEN 800 MG/1
800 TABLET, FILM COATED ORAL 3 TIMES DAILY
Qty: 21 TABLET | Refills: 0 | Status: SHIPPED | OUTPATIENT
Start: 2024-10-20

## 2024-10-20 RX ORDER — CEPHALEXIN 500 MG/1
500 CAPSULE ORAL EVERY 6 HOURS SCHEDULED
Qty: 28 CAPSULE | Refills: 0 | Status: SHIPPED | OUTPATIENT
Start: 2024-10-20 | End: 2024-10-27

## 2024-10-20 RX ORDER — CEFTRIAXONE 1 G/50ML
1000 INJECTION, SOLUTION INTRAVENOUS ONCE
Status: COMPLETED | OUTPATIENT
Start: 2024-10-20 | End: 2024-10-20

## 2024-10-20 RX ORDER — ONDANSETRON 2 MG/ML
4 INJECTION INTRAMUSCULAR; INTRAVENOUS ONCE
Status: COMPLETED | OUTPATIENT
Start: 2024-10-20 | End: 2024-10-20

## 2024-10-20 RX ORDER — ONDANSETRON 4 MG/1
4 TABLET, FILM COATED ORAL EVERY 6 HOURS
Qty: 20 TABLET | Refills: 0 | Status: SHIPPED | OUTPATIENT
Start: 2024-10-20

## 2024-10-20 RX ORDER — HYDROMORPHONE HCL/PF 1 MG/ML
0.5 SYRINGE (ML) INJECTION ONCE
Status: COMPLETED | OUTPATIENT
Start: 2024-10-20 | End: 2024-10-20

## 2024-10-20 RX ORDER — OXYCODONE AND ACETAMINOPHEN 5; 325 MG/1; MG/1
1 TABLET ORAL EVERY 8 HOURS PRN
Qty: 12 TABLET | Refills: 0 | Status: SHIPPED | OUTPATIENT
Start: 2024-10-20 | End: 2024-10-24

## 2024-10-20 RX ADMIN — MORPHINE SULFATE 2 MG: 2 INJECTION, SOLUTION INTRAMUSCULAR; INTRAVENOUS at 08:41

## 2024-10-20 RX ADMIN — SODIUM CHLORIDE 1000 ML: 0.9 INJECTION, SOLUTION INTRAVENOUS at 08:43

## 2024-10-20 RX ADMIN — HYDROMORPHONE HYDROCHLORIDE 0.5 MG: 1 INJECTION, SOLUTION INTRAMUSCULAR; INTRAVENOUS; SUBCUTANEOUS at 11:27

## 2024-10-20 RX ADMIN — DEXAMETHASONE SODIUM PHOSPHATE 10 MG: 10 INJECTION, SOLUTION INTRAMUSCULAR; INTRAVENOUS at 08:38

## 2024-10-20 RX ADMIN — ONDANSETRON 4 MG: 2 INJECTION INTRAMUSCULAR; INTRAVENOUS at 08:34

## 2024-10-20 RX ADMIN — CEFTRIAXONE 1000 MG: 1 INJECTION, SOLUTION INTRAVENOUS at 10:19

## 2024-10-20 NOTE — ED NOTES
Patient informed of need for urine sample. Will ring call bell when ready to provide sample.      Tracy Keita RN  10/20/24 [id number]

## 2024-10-20 NOTE — ED PROVIDER NOTES
Time reflects when diagnosis was documented in both MDM as applicable and the Disposition within this note       Time User Action Codes Description Comment    10/20/2024 10:38 AM Thelma Deleon Add [N39.0] Urinary tract infection     10/20/2024 10:38 AM Yennifer Deleonia Add [R10.9] Abdominal pain     10/20/2024 10:39 AM ThorYennifer vargasia Add [R11.2] Nausea & vomiting     10/20/2024 10:39 AM Thelma Deleon Add [M79.18] Musculoskeletal pain           ED Disposition       ED Disposition   Discharge    Condition   Stable    Date/Time   Sun Oct 20, 2024 10:19 AM    Comment   Lizette Rahmantetekatyaarielle discharge to home/self care.                   Assessment & Plan       Medical Decision Making  Ddx: chronic pain, frozen shoulder, osteoarthritis, Gastroenteritis, UTI, viral syndrome    Amount and/or Complexity of Data Reviewed  Labs: ordered.    Risk  Prescription drug management.        ED Course as of 10/20/24 1316   [place] Oct 20, 2024   1038 Patient has a urinary tract infection.  Treated with Rocephin in the ED.  Will be discharged home for outpatient follow-up with her PCP and orthopedist.       Medications   sodium chloride 0.9 % bolus 1,000 mL (0 mL Intravenous Stopped 10/20/24 0943)   ondansetron (ZOFRAN) injection 4 mg (4 mg Intravenous Given 10/20/24 0834)   morphine injection 2 mg (2 mg Intravenous Given 10/20/24 0841)   dexamethasone (PF) (DECADRON) injection 10 mg (10 mg Intravenous Given 10/20/24 0838)   cefTRIAXone (ROCEPHIN) IVPB (premix in dextrose) 1,000 mg 50 mL (0 mg Intravenous Stopped 10/20/24 1049)   HYDROmorphone (DILAUDID) injection 0.5 mg (0.5 mg Intravenous Given 10/20/24 1127)       ED Risk Strat Scores                           SBIRT 22yo+      Flowsheet Row Most Recent Value   Initial Alcohol Screen: US AUDIT-C     1. How often do you have a drink containing alcohol? 0 Filed at: 10/20/2024 0751   2. How many drinks containing alcohol do you have on a typical day you are drinking?  0 Filed at: 10/20/2024  0751   3b. FEMALE Any Age, or MALE 65+: How often do you have 4 or more drinks on one occassion? 0 Filed at: 10/20/2024 0751   Audit-C Score 0 Filed at: 10/20/2024 0751   ROLANDO: How many times in the past year have you...    Used an illegal drug or used a prescription medication for non-medical reasons? Never Filed at: 10/20/2024 0751                            History of Present Illness       Chief Complaint   Patient presents with    Shoulder Pain     RIGHT shoulder pain.    Abdominal Pain     Pain with urination. Abdominal disc. Started Medical Marijuana and pt also with N/V after use of the product        Past Medical History:   Diagnosis Date    Anxiety     Diverticular disease     GERD (gastroesophageal reflux disease)     Hypertension       Past Surgical History:   Procedure Laterality Date    BOWEL RESECTION      HERNIA REPAIR      KNEE ARTHROSCOPY      SHOULDER SURGERY Left       Family History   Problem Relation Age of Onset    Dementia Mother     Cerebral aneurysm Father       Social History     Tobacco Use    Smoking status: Former     Current packs/day: 0.00     Types: Cigarettes     Quit date:      Years since quittin.8    Smokeless tobacco: Never   Vaping Use    Vaping status: Never Used   Substance Use Topics    Alcohol use: Yes     Comment: social    Drug use: Yes     Types: Marijuana     Comment: medical marijuana      E-Cigarette/Vaping    E-Cigarette Use Never User       E-Cigarette/Vaping Substances      I have reviewed and agree with the history as documented.     This is a 63-year-old female presenting to the ED for evaluation of multiple complaints.  Patient states that she has a history of chronic shoulder pain dating back from multiple falls that she experienced in February.  She does follow-up with an orthopedist and has had intra-articular steroid shots however states that she has been experiencing increased pain in the right shoulder without any recent injury or fall.  In  addition patient states that she has been having lower abdominal pain.  Last episode of abdominal pain was yesterday and she has been having dysuria as well.  She does have a history of urinary tract infections.  Patient states that she recently started medical marijuana as of yesterday and has been having nausea and vomiting which she attributes to the dose of the THC which she has never taken before.        Review of Systems   Constitutional:  Negative for chills and fever.   HENT:  Negative for ear pain and sore throat.    Eyes:  Negative for pain and visual disturbance.   Respiratory:  Negative for cough and shortness of breath.    Cardiovascular:  Negative for chest pain and palpitations.   Gastrointestinal:  Positive for abdominal pain, nausea and vomiting.   Genitourinary:  Positive for dysuria. Negative for flank pain and hematuria.   Musculoskeletal:  Positive for arthralgias, myalgias and neck pain. Negative for back pain.   Skin:  Negative for color change and rash.   Neurological:  Negative for seizures and syncope.   All other systems reviewed and are negative.          Objective       ED Triage Vitals   Temperature Pulse Blood Pressure Respirations SpO2 Patient Position - Orthostatic VS   10/20/24 0749 10/20/24 0749 10/20/24 0749 10/20/24 0749 10/20/24 0749 10/20/24 0800   97.9 °F (36.6 °C) 101 149/89 17 96 % Lying      Temp Source Heart Rate Source BP Location FiO2 (%) Pain Score    10/20/24 0749 10/20/24 0749 10/20/24 0749 -- 10/20/24 0749    Temporal Monitor Left arm  9      Vitals      Date and Time Temp Pulse SpO2 Resp BP Pain Score FACES Pain Rating User   10/20/24 1130 -- 78 96 % 16 158/77 -- -- AD   10/20/24 0930 -- 69 95 % 18 156/76 -- -- SS   10/20/24 0911 -- -- -- -- -- 7 -- AD   10/20/24 0900 -- 67 96 % 18 151/76 -- -- AD   10/20/24 0845 -- 78 96 % 16 138/77 -- -- AD   10/20/24 0806 -- -- -- -- -- 9 -- AD   10/20/24 0800 -- 88 98 % -- 149/89 -- -- AD   10/20/24 0749 97.9 °F (36.6 °C) 101 96  % 17 149/89 9 -- KM            Physical Exam  Vitals and nursing note reviewed.   Constitutional:       General: She is in acute distress.      Appearance: She is well-developed and normal weight.   HENT:      Head: Normocephalic and atraumatic.      Mouth/Throat:      Mouth: Mucous membranes are moist.   Eyes:      Extraocular Movements: Extraocular movements intact.      Conjunctiva/sclera: Conjunctivae normal.   Cardiovascular:      Rate and Rhythm: Normal rate and regular rhythm.      Heart sounds: Normal heart sounds. No murmur heard.  Pulmonary:      Effort: Pulmonary effort is normal. No respiratory distress.      Breath sounds: Normal breath sounds.   Abdominal:      General: Abdomen is flat. Bowel sounds are normal. There are signs of injury.      Palpations: Abdomen is soft.      Tenderness: There is no abdominal tenderness. There is no guarding or rebound.      Hernia: A hernia is present.   Musculoskeletal:         General: No swelling.      Right shoulder: Tenderness present. Decreased range of motion.      Cervical back: Pain with movement present. No spinous process tenderness.   Skin:     General: Skin is warm and dry.      Capillary Refill: Capillary refill takes less than 2 seconds.   Neurological:      General: No focal deficit present.      Mental Status: She is alert.   Psychiatric:         Mood and Affect: Mood normal.         Results Reviewed       Procedure Component Value Units Date/Time    Urine Microscopic [[id number]]  (Abnormal) Collected: 10/20/24 0832    Lab Status: Final result Specimen: Urine, Clean Catch Updated: 10/20/24 0913     RBC, UA 2-4 /hpf      WBC, UA Innumerable /hpf      Epithelial Cells Occasional /hpf      Bacteria, UA Moderate /hpf      MUCUS THREADS Occasional    Urine culture [[id number]] Collected: 10/20/24 0832    Lab Status: In process Specimen: Urine, Clean Catch Updated: 10/20/24 0913    UA w Reflex to Microscopic w Reflex to Culture [[id number]]  (Abnormal)  Collected: 10/20/24 0832    Lab Status: Final result Specimen: Urine, Clean Catch Updated: 10/20/24 0853     Color, UA Orange     Clarity, UA Cloudy     Specific Gravity, UA 1.025     pH, UA 6.5     Leukocytes, UA Moderate     Nitrite, UA Negative     Protein, UA 30 (1+) mg/dl      Glucose, UA Negative mg/dl      Ketones, UA >=80 (3+) mg/dl      Urobilinogen, UA 1.0 E.U./dl      Bilirubin, UA Large     Occult Blood, UA Small    Comprehensive metabolic panel [[id number]]  (Abnormal) Collected: 10/20/24 0759    Lab Status: Final result Specimen: Blood from Arm, Right Updated: 10/20/24 0830     Sodium 136 mmol/L      Potassium 3.6 mmol/L      Chloride 91 mmol/L      CO2 29 mmol/L      ANION GAP 16 mmol/L      BUN 19 mg/dL      Creatinine 1.22 mg/dL      Glucose 123 mg/dL      Calcium 10.6 mg/dL      AST 83 U/L      ALT 40 U/L      Alkaline Phosphatase 65 U/L      Total Protein 7.8 g/dL      Albumin 4.3 g/dL      Total Bilirubin 2.52 mg/dL      eGFR 47 ml/min/1.73sq m     Narrative:      [employer] guidelines for Chronic Kidney Disease (CKD):     Stage 1 with normal or high GFR (GFR > 90 mL/min/1.73 square meters)    Stage 2 Mild CKD (GFR = 60-89 mL/min/1.73 square meters)    Stage 3A Moderate CKD (GFR = 45-59 mL/min/1.73 square meters)    Stage 3B Moderate CKD (GFR = 30-44 mL/min/1.73 square meters)    Stage 4 Severe CKD (GFR = 15-29 mL/min/1.73 square meters)    Stage 5 End Stage CKD (GFR <15 mL/min/1.73 square meters)  Note: GFR calculation is accurate only with a steady state creatinine    Lipase [[id number]]  (Normal) Collected: 10/20/24 0759    Lab Status: Final result Specimen: Blood from Arm, Right Updated: 10/20/24 0830     Lipase 11 u/L     CBC and differential [[id number]]  (Abnormal) Collected: 10/20/24 0759    Lab Status: Final result Specimen: Blood from Arm, Right Updated: 10/20/24 0807     WBC 6.59 Thousand/uL      RBC 3.57 Million/uL      Hemoglobin 13.1 g/dL      Hematocrit 38.2 %        fL      MCH 36.7 pg      MCHC 34.3 g/dL      RDW 12.1 %      MPV 8.8 fL      Platelets 245 Thousands/uL      nRBC 0 /100 WBCs      Segmented % 65 %      Immature Grans % 0 %      Lymphocytes % 25 %      Monocytes % 9 %      Eosinophils Relative 0 %      Basophils Relative 1 %      Absolute Neutrophils 4.27 Thousands/µL      Absolute Immature Grans 0.02 Thousand/uL      Absolute Lymphocytes 1.67 Thousands/µL      Absolute Monocytes 0.57 Thousand/µL      Eosinophils Absolute 0.02 Thousand/µL      Basophils Absolute 0.04 Thousands/µL             No orders to display       Procedures    ED Medication and Procedure Management   Prior to Admission Medications   Prescriptions Last Dose Informant Patient Reported? Taking?   Ascorbic Acid (vitamin C) 100 MG tablet   Yes No   Sig: Take 100 mg by mouth daily   albuterol (2.5 mg/3 mL) 0.083 % nebulizer solution   No No   Sig: Take 3 mL (2.5 mg total) by nebulization every 6 (six) hours as needed for shortness of breath Dispense 60 vials   buPROPion (WELLBUTRIN XL) 300 mg 24 hr tablet   No No   Sig: Take 1 tablet (300 mg total) by mouth daily   cholecalciferol (VITAMIN D3) 1,000 units tablet   Yes No   Sig: Take 1,000 Units by mouth daily   magnesium 30 MG tablet   Yes No   Sig: Take 30 mg by mouth 2 (two) times a day   metoprolol tartrate (LOPRESSOR) 50 mg tablet   Yes No   Sig: Take 50 mg by mouth every 12 (twelve) hours   pantoprazole (PROTONIX) 40 mg tablet   Yes No   Sig: Take 40 mg by mouth 2 (two) times a day   sucralfate (CARAFATE) 1 g tablet   No No   Sig: Take 1 tablet (1 g total) by mouth 4 (four) times a day (before meals and at bedtime)   zinc gluconate 50 mg tablet   Yes No   Sig: Take 50 mg by mouth daily      Facility-Administered Medications: None     Discharge Medication List as of 10/20/2024 10:41 AM        START taking these medications    Details   cephalexin (KEFLEX) 500 mg capsule Take 1 capsule (500 mg total) by mouth every 6 (six) hours for 7  days, Starting Sun 10/20/2024, Until Sun 10/27/2024, Normal      ibuprofen (MOTRIN) 800 mg tablet Take 1 tablet (800 mg total) by mouth 3 (three) times a day, Starting Sun 10/20/2024, Normal      ondansetron (ZOFRAN) 4 mg tablet Take 1 tablet (4 mg total) by mouth every 6 (six) hours, Starting Sun 10/20/2024, Normal      oxyCODONE-acetaminophen (Percocet) 5-325 mg per tablet Take 1 tablet by mouth every 8 (eight) hours as needed for severe pain for up to 4 days Max Daily Amount: 3 tablets, Starting Sun 10/20/2024, Until Thu 10/24/2024 at 2359, Normal           CONTINUE these medications which have NOT CHANGED    Details   albuterol (2.5 mg/3 mL) 0.083 % nebulizer solution Take 3 mL (2.5 mg total) by nebulization every 6 (six) hours as needed for shortness of breath Dispense 60 vials, Starting Mon 8/23/2021, Normal      Ascorbic Acid (vitamin C) 100 MG tablet Take 100 mg by mouth daily, Historical Med      buPROPion (WELLBUTRIN XL) 300 mg 24 hr tablet Take 1 tablet (300 mg total) by mouth daily, Starting Sun 2/18/2024, Normal      cholecalciferol (VITAMIN D3) 1,000 units tablet Take 1,000 Units by mouth daily, Historical Med      magnesium 30 MG tablet Take 30 mg by mouth 2 (two) times a day, Historical Med      metoprolol tartrate (LOPRESSOR) 50 mg tablet Take 50 mg by mouth every 12 (twelve) hours, Historical Med      pantoprazole (PROTONIX) 40 mg tablet Take 40 mg by mouth 2 (two) times a day, Historical Med      sucralfate (CARAFATE) 1 g tablet Take 1 tablet (1 g total) by mouth 4 (four) times a day (before meals and at bedtime), Starting Fri 4/5/2024, Until Sun 5/5/2024, Normal      zinc gluconate 50 mg tablet Take 50 mg by mouth daily, Historical Med           No discharge procedures on file.  ED SEPSIS DOCUMENTATION   Time reflects when diagnosis was documented in both MDM as applicable and the Disposition within this note       Time User Action Codes Description Comment    10/20/2024 10:38 AM Thelma Deleon  Add [N39.0] Urinary tract infection     10/20/2024 10:38 AM Thelma Deleon Add [R10.9] Abdominal pain     10/20/2024 10:39 AM Thelma Deleon Add [R11.2] Nausea & vomiting     10/20/2024 10:39 AM Thelma Deleon Add [M79.18] Musculoskeletal pain                  Thelma Deleon DO  10/20/24 3510

## 2024-10-21 LAB — BACTERIA UR CULT: ABNORMAL
